# Patient Record
Sex: FEMALE | Race: OTHER | Employment: PART TIME | URBAN - METROPOLITAN AREA
[De-identification: names, ages, dates, MRNs, and addresses within clinical notes are randomized per-mention and may not be internally consistent; named-entity substitution may affect disease eponyms.]

---

## 2020-08-30 ENCOUNTER — HOSPITAL ENCOUNTER (EMERGENCY)
Age: 30
Discharge: HOME | End: 2020-08-30
Attending: EMERGENCY MEDICINE
Payer: MEDICAID

## 2020-08-30 ENCOUNTER — APPOINTMENT (OUTPATIENT)
Dept: ULTRASOUND IMAGING | Age: 30
End: 2020-08-30
Attending: EMERGENCY MEDICINE
Payer: MEDICAID

## 2020-08-30 VITALS
HEART RATE: 66 BPM | TEMPERATURE: 97.6 F | SYSTOLIC BLOOD PRESSURE: 111 MMHG | DIASTOLIC BLOOD PRESSURE: 76 MMHG | RESPIRATION RATE: 14 BRPM | OXYGEN SATURATION: 99 %

## 2020-08-30 DIAGNOSIS — N83.201 HEMORRHAGIC CYST OF RIGHT OVARY: Primary | ICD-10-CM

## 2020-08-30 DIAGNOSIS — R10.2 PELVIC PAIN IN FEMALE: ICD-10-CM

## 2020-08-30 LAB
ALBUMIN SERPL-MCNC: 3.6 G/DL (ref 3.4–5)
ALBUMIN/GLOB SERPL: 0.8 {RATIO} (ref 0.8–1.7)
ALP SERPL-CCNC: 63 U/L (ref 45–117)
ALT SERPL-CCNC: 19 U/L (ref 13–56)
ANION GAP SERPL CALC-SCNC: 2 MMOL/L (ref 3–18)
APPEARANCE UR: CLEAR
AST SERPL-CCNC: 22 U/L (ref 10–38)
BASOPHILS # BLD: 0 K/UL (ref 0–0.1)
BASOPHILS NFR BLD: 0 % (ref 0–2)
BILIRUB SERPL-MCNC: 0.3 MG/DL (ref 0.2–1)
BILIRUB UR QL: NEGATIVE
BUN SERPL-MCNC: 8 MG/DL (ref 7–18)
BUN/CREAT SERPL: 14 (ref 12–20)
CALCIUM SERPL-MCNC: 8.7 MG/DL (ref 8.5–10.1)
CHLORIDE SERPL-SCNC: 108 MMOL/L (ref 100–111)
CO2 SERPL-SCNC: 30 MMOL/L (ref 21–32)
COLOR UR: YELLOW
CREAT SERPL-MCNC: 0.56 MG/DL (ref 0.6–1.3)
DIFFERENTIAL METHOD BLD: ABNORMAL
EOSINOPHIL # BLD: 0 K/UL (ref 0–0.4)
EOSINOPHIL NFR BLD: 0 % (ref 0–5)
ERYTHROCYTE [DISTWIDTH] IN BLOOD BY AUTOMATED COUNT: 14.8 % (ref 11.6–14.5)
GLOBULIN SER CALC-MCNC: 4.6 G/DL (ref 2–4)
GLUCOSE SERPL-MCNC: 90 MG/DL (ref 74–99)
GLUCOSE UR STRIP.AUTO-MCNC: NEGATIVE MG/DL
HCG UR QL: NEGATIVE
HCT VFR BLD AUTO: 34.2 % (ref 35–45)
HGB BLD-MCNC: 10.9 G/DL (ref 12–16)
HGB UR QL STRIP: NEGATIVE
KETONES UR QL STRIP.AUTO: NEGATIVE MG/DL
LEUKOCYTE ESTERASE UR QL STRIP.AUTO: NEGATIVE
LYMPHOCYTES # BLD: 1.5 K/UL (ref 0.9–3.6)
LYMPHOCYTES NFR BLD: 21 % (ref 21–52)
MCH RBC QN AUTO: 25.8 PG (ref 24–34)
MCHC RBC AUTO-ENTMCNC: 31.9 G/DL (ref 31–37)
MCV RBC AUTO: 81 FL (ref 74–97)
MONOCYTES # BLD: 0.7 K/UL (ref 0.05–1.2)
MONOCYTES NFR BLD: 9 % (ref 3–10)
NEUTS SEG # BLD: 5 K/UL (ref 1.8–8)
NEUTS SEG NFR BLD: 70 % (ref 40–73)
NITRITE UR QL STRIP.AUTO: NEGATIVE
PH UR STRIP: 6 [PH] (ref 5–8)
PLATELET # BLD AUTO: 250 K/UL (ref 135–420)
PMV BLD AUTO: 10.2 FL (ref 9.2–11.8)
POTASSIUM SERPL-SCNC: 3.6 MMOL/L (ref 3.5–5.5)
PROT SERPL-MCNC: 8.2 G/DL (ref 6.4–8.2)
PROT UR STRIP-MCNC: NEGATIVE MG/DL
RBC # BLD AUTO: 4.22 M/UL (ref 4.2–5.3)
SERVICE CMNT-IMP: NORMAL
SODIUM SERPL-SCNC: 140 MMOL/L (ref 136–145)
SP GR UR REFRACTOMETRY: 1.01 (ref 1–1.03)
UROBILINOGEN UR QL STRIP.AUTO: 0.2 EU/DL (ref 0.2–1)
WBC # BLD AUTO: 7.1 K/UL (ref 4.6–13.2)
WET PREP GENITAL: NORMAL

## 2020-08-30 PROCEDURE — 81025 URINE PREGNANCY TEST: CPT

## 2020-08-30 PROCEDURE — 87210 SMEAR WET MOUNT SALINE/INK: CPT

## 2020-08-30 PROCEDURE — 99284 EMERGENCY DEPT VISIT MOD MDM: CPT

## 2020-08-30 PROCEDURE — 80053 COMPREHEN METABOLIC PANEL: CPT

## 2020-08-30 PROCEDURE — 81003 URINALYSIS AUTO W/O SCOPE: CPT

## 2020-08-30 PROCEDURE — 85025 COMPLETE CBC W/AUTO DIFF WBC: CPT

## 2020-08-30 PROCEDURE — 76830 TRANSVAGINAL US NON-OB: CPT

## 2020-08-30 PROCEDURE — 87491 CHLMYD TRACH DNA AMP PROBE: CPT

## 2020-08-30 RX ORDER — IBUPROFEN 800 MG/1
800 TABLET ORAL EVERY 8 HOURS
Qty: 21 TAB | Refills: 0 | Status: SHIPPED | OUTPATIENT
Start: 2020-08-30 | End: 2020-09-04

## 2020-08-30 NOTE — DISCHARGE INSTRUCTIONS
Patient Education        Hemorrhagic Ovarian Cyst: Care Instructions  Your Care Instructions     Sometimes a sac forms on the surface of a woman's ovary. When the sac swells up with fluid, it forms a cyst. If the cyst bleeds, it is called a hemorrhagic (say \"Sussy\") ovarian cyst. If the cyst breaks open, blood and fluid spill out into the lower belly and pelvis. You may not have symptoms from the cyst. But if it is large, or if it twists or breaks open, you may have pain or other problems. You may feel pain from the cyst or have symptoms from losing blood. Your doctor may use a pelvic ultrasound to see if you have a cyst. Blood tests can help your doctor tell if the cyst is bleeding or you have lost a lot of blood. Treatment depends on your symptoms. If they are mild, your doctor may suggest carefully watching your symptoms and doing blood tests again. But if you have a cyst that is very large, bleeds a lot, or causes other problems, your doctor may suggest surgery to remove it. If the bleeding is heavy, you may also need treatment to replace the blood. Follow-up care is a key part of your treatment and safety. Be sure to make and go to all appointments, and call your doctor if you are having problems. It's also a good idea to know your test results and keep a list of the medicines you take. How can you care for yourself at home? · Use heat, such as a warm water bottle, a heating pad set on low, or a warm bath, to relax tense muscles and relieve cramping. · Be safe with medicines. Read and follow all instructions on the label. ? If the doctor gave you a prescription medicine for pain, take it as prescribed. ? If you are not taking a prescription pain medicine, ask your doctor if you can take an over-the-counter medicine. When should you call for help? [id number] anytime you think you may need emergency care. For example, call if:  · You passed out (lost consciousness).   Call your doctor now or seek immediate medical care if:  · You have severe vaginal bleeding. · You are dizzy or lightheaded, or you feel like you may faint. · You have new or worse pain in your belly or pelvis. Watch closely for changes in your health, and be sure to contact your doctor if:  · You think you may be pregnant. · You do not get better as expected. Where can you learn more? Go to http://www.gray.com/  Enter D256 in the search box to learn more about \"Hemorrhagic Ovarian Cyst: Care Instructions. \"  Current as of: November 8, 2019               Content Version: 12.5  © 5643-2501 [employer]. Care instructions adapted under license by [employer] (which disclaims liability or warranty for this information). If you have questions about a medical condition or this instruction, always ask your healthcare professional. [address] any warranty or liability for your use of this information.

## 2020-08-30 NOTE — ED NOTES
Anna Alaniz RN gave d/c to home instructions to pt. Pt verbalizes understanding. VSS. Pt d/c to home.

## 2020-08-30 NOTE — ED PROVIDER NOTES
HPI   Patient presents with a chief complaint of dysuria and pelvic pain. She states that her symptoms began 2 days ago and also reports mild lower back pain. She denies any hematuria, fever, flank pain, vaginal discharge, or vaginal bleeding. Patient is  2, para 2-0-0-2. Her last menstrual period was approximately 3 weeks ago. Past Medical History:   Diagnosis Date    Abnormal Papanicolaou smear of cervix 2012    colpo       Past Surgical History:   Procedure Laterality Date    HX BREAST LUMPECTOMY      rt breast lump removed    HX  SECTION  ,     failure to progress.           Family History:   Problem Relation Age of Onset   Gilberto Hoose Hypertension Mother     Asthma Mother     No Known Problems Father     Thyroid Disease Maternal Grandmother     Diabetes Paternal Grandmother     Asthma Paternal Grandmother     Heart Disease Paternal Grandmother        Social History     Socioeconomic History    Marital status: SINGLE     Spouse name: Not on file    Number of children: Not on file    Years of education: Not on file    Highest education level: Not on file   Occupational History    Not on file   Social Needs    Financial resource strain: Not on file    Food insecurity     Worry: Not on file     Inability: Not on file    Transportation needs     Medical: Not on file     Non-medical: Not on file   Tobacco Use    Smoking status: Former Smoker    Smokeless tobacco: Never Used    Tobacco comment: social smoker in the past.    Substance and Sexual Activity    Alcohol use: No     Alcohol/week: 0.0 standard drinks    Drug use: No    Sexual activity: Not Currently     Partners: Male     Birth control/protection: Abstinence   Lifestyle    Physical activity     Days per week: Not on file     Minutes per session: Not on file    Stress: Not on file   Relationships    Social connections     Talks on phone: Not on file     Gets together: Not on file     Attends Voodoo service: Not on file     Active member of club or organization: Not on file     Attends meetings of clubs or organizations: Not on file     Relationship status: Not on file    Intimate partner violence     Fear of current or ex partner: Not on file     Emotionally abused: Not on file     Physically abused: Not on file     Forced sexual activity: Not on file   Other Topics Concern    Not on file   Social History Narrative    Not on file         ALLERGIES: Patient has no known allergies. Review of Systems   Constitutional: Negative for chills, fatigue and fever. HENT: Negative for congestion, ear pain, postnasal drip and sore throat. Eyes: Negative for pain, discharge and itching. Respiratory: Negative for cough, shortness of breath and wheezing. Cardiovascular: Negative for palpitations and leg swelling. Gastrointestinal: Negative for abdominal pain, diarrhea, nausea and vomiting. Endocrine: Negative for polydipsia, polyphagia and polyuria. Genitourinary: Positive for dysuria and pelvic pain. Negative for flank pain, frequency, vaginal bleeding, vaginal discharge and vaginal pain. Musculoskeletal: Negative for arthralgias, back pain, myalgias, neck pain and neck stiffness. Allergic/Immunologic: Negative for immunocompromised state. Neurological: Negative for dizziness, seizures, syncope and headaches. Hematological: Negative for adenopathy. Does not bruise/bleed easily. Psychiatric/Behavioral: Negative for agitation and behavioral problems. The patient is not nervous/anxious and is not hyperactive. Vitals:    08/30/20 0933   BP: 133/85   Pulse: 86   Resp: 16   Temp: 98.8 °F (37.1 °C)   SpO2: 100%            Physical Exam  Vitals signs and nursing note reviewed. Constitutional:       General: She is not in acute distress. Appearance: She is well-developed. She is not diaphoretic. HENT:      Head: Normocephalic and atraumatic.       Right Ear: External ear normal.      Left Ear: External ear normal.      Nose: Nose normal.      Mouth/Throat:      Pharynx: No oropharyngeal exudate. Eyes:      General: No scleral icterus. Right eye: No discharge. Left eye: No discharge. Conjunctiva/sclera: Conjunctivae normal.      Pupils: Pupils are equal, round, and reactive to light. Neck:      Thyroid: No thyromegaly. Vascular: No JVD. Trachea: No tracheal deviation. Cardiovascular:      Rate and Rhythm: Normal rate and regular rhythm. Heart sounds: Normal heart sounds. No murmur. No friction rub. No gallop. Pulmonary:      Effort: Pulmonary effort is normal. No respiratory distress. Breath sounds: Normal breath sounds. No stridor. No wheezing or rales. Chest:      Chest wall: No tenderness. Abdominal:      General: Bowel sounds are normal. There is no distension. Palpations: Abdomen is soft. There is no mass. Tenderness: Tenderness: mild. There is no guarding or rebound. Genitourinary:     Vagina: Normal. No foreign body. No vaginal discharge. Comments: Mild bilateral adnexal tenderness. Musculoskeletal: Normal range of motion. General: No tenderness. Lymphadenopathy:      Cervical: No cervical adenopathy. Skin:     General: Skin is warm and dry. Coloration: Skin is not pale. Findings: No erythema or rash. Neurological:      Mental Status: She is alert and oriented to person, place, and time. Cranial Nerves: No cranial nerve deficit. Deep Tendon Reflexes: Reflexes are normal and symmetric.    Psychiatric:         Behavior: Behavior normal.         Judgment: Judgment normal.          MDM  Number of Diagnoses or Management Options  Diagnosis management comments: Differential diagnosis includes: UTI, vaginitis, uterine fibroids       Amount and/or Complexity of Data Reviewed  Clinical lab tests: reviewed and ordered  Tests in the radiology section of CPT®: ordered  Tests in the medicine section of CPT®: ordered and reviewed  Review and summarize past medical records: yes    Risk of Complications, Morbidity, and/or Mortality  Presenting problems: moderate  Diagnostic procedures: moderate  Management options: moderate    Patient Progress  Patient progress: stable    Procedures    Recent Results (from the past 12 hour(s))   URINALYSIS W/ RFLX MICROSCOPIC    Collection Time: 08/30/20  9:37 AM   Result Value Ref Range    Color YELLOW      Appearance CLEAR      Specific gravity 1.013 1.005 - 1.030      pH (UA) 6.0 5.0 - 8.0      Protein Negative NEG mg/dL    Glucose Negative NEG mg/dL    Ketone Negative NEG mg/dL    Bilirubin Negative NEG      Blood Negative NEG      Urobilinogen 0.2 0.2 - 1.0 EU/dL    Nitrites Negative NEG      Leukocyte Esterase Negative NEG     HCG URINE, QL    Collection Time: 08/30/20  9:37 AM   Result Value Ref Range    HCG urine, QL Negative NEG     WET PREP    Collection Time: 08/30/20 11:00 AM    Specimen: Vagina   Result Value Ref Range    Special Requests: NO SPECIAL REQUESTS      Wet prep NO YEAST,TRICHOMONAS OR CLUE CELLS NOTED     CBC WITH AUTOMATED DIFF    Collection Time: 08/30/20 11:40 AM   Result Value Ref Range    WBC 7.1 4.6 - 13.2 K/uL    RBC 4.22 4.20 - 5.30 M/uL    HGB 10.9 (L) 12.0 - 16.0 g/dL    HCT 34.2 (L) 35.0 - 45.0 %    MCV 81.0 74.0 - 97.0 FL    MCH 25.8 24.0 - 34.0 PG    MCHC 31.9 31.0 - 37.0 g/dL    RDW 14.8 (H) 11.6 - 14.5 %    PLATELET 367 998 - 531 K/uL    MPV 10.2 9.2 - 11.8 FL    NEUTROPHILS 70 40 - 73 %    LYMPHOCYTES 21 21 - 52 %    MONOCYTES 9 3 - 10 %    EOSINOPHILS 0 0 - 5 %    BASOPHILS 0 0 - 2 %    ABS. NEUTROPHILS 5.0 1.8 - 8.0 K/UL    ABS. LYMPHOCYTES 1.5 0.9 - 3.6 K/UL    ABS. MONOCYTES 0.7 0.05 - 1.2 K/UL    ABS. EOSINOPHILS 0.0 0.0 - 0.4 K/UL    ABS.  BASOPHILS 0.0 0.0 - 0.1 K/UL    DF AUTOMATED     METABOLIC PANEL, COMPREHENSIVE    Collection Time: 08/30/20 11:40 AM   Result Value Ref Range    Sodium 140 136 - 145 mmol/L    Potassium 3.6 3.5 - 5.5 mmol/L Chloride 108 100 - 111 mmol/L    CO2 30 21 - 32 mmol/L    Anion gap 2 (L) 3.0 - 18 mmol/L    Glucose 90 74 - 99 mg/dL    BUN 8 7.0 - 18 MG/DL    Creatinine 0.56 (L) 0.6 - 1.3 MG/DL    BUN/Creatinine ratio 14 12 - 20      GFR est AA >60 >60 ml/min/1.73m2    GFR est non-AA >60 >60 ml/min/1.73m2    Calcium 8.7 8.5 - 10.1 MG/DL    Bilirubin, total 0.3 0.2 - 1.0 MG/DL    ALT (SGPT) 19 13 - 56 U/L    AST (SGOT) 22 10 - 38 U/L    Alk. phosphatase 63 45 - 117 U/L    Protein, total 8.2 6.4 - 8.2 g/dL    Albumin 3.6 3.4 - 5.0 g/dL    Globulin 4.6 (H) 2.0 - 4.0 g/dL    A-G Ratio 0.8 0.8 - 1.7         US TRANSVAGINAL    (Results Pending)     Diagnosis:   1. Hemorrhagic cyst of right ovary          Disposition: Discharge home    Follow-up Information     Follow up With Specialties Details Why Contact Info    Theo Whaley DO Family Medicine Schedule an appointment as soon as possible for a visit in 2 days  [id number] [id number] DEPT Emergency Medicine  As needed, If symptoms worsen [address]  [phone]          Patient's Medications   Start Taking    IBUPROFEN (MOTRIN) 800 MG TABLET    Take 1 Tab by mouth every eight (8) hours for 5 days.    Continue Taking    No medications on file   These Medications have changed    No medications on file   Stop Taking    No medications on file

## 2020-09-01 LAB
C TRACH RRNA SPEC QL NAA+PROBE: NEGATIVE
N GONORRHOEA RRNA SPEC QL NAA+PROBE: NEGATIVE
SPECIMEN SOURCE: NORMAL

## 2020-12-18 ENCOUNTER — VIRTUAL VISIT (OUTPATIENT)
Dept: FAMILY MEDICINE CLINIC | Age: 30
End: 2020-12-18
Payer: MEDICAID

## 2020-12-18 DIAGNOSIS — R10.2 PELVIC PAIN: ICD-10-CM

## 2020-12-18 DIAGNOSIS — N83.201 CYST OF RIGHT OVARY: ICD-10-CM

## 2020-12-18 DIAGNOSIS — D25.9 UTERINE LEIOMYOMA, UNSPECIFIED LOCATION: Primary | ICD-10-CM

## 2020-12-18 PROCEDURE — 99213 OFFICE O/P EST LOW 20 MIN: CPT | Performed by: NURSE PRACTITIONER

## 2020-12-18 RX ORDER — IBUPROFEN 800 MG/1
800 TABLET ORAL
Qty: 30 TAB | Refills: 2 | Status: SHIPPED | OUTPATIENT
Start: 2020-12-18

## 2020-12-18 NOTE — PROGRESS NOTES
Laura Abebe was seen by synchronous (real-time) audio-video technology DOXY on 12/18/2020. Consent: Laura Abebe, who was seen by synchronous (real-time) audio-video technology, and/or her healthcare decision maker, is aware that this patient-initiated, Telehealth encounter on 12/18/2020 is a billable service, with coverage as determined by her insurance carrier. She is aware that she may receive a bill and has provided verbal consent to proceed: yes  712  Subjective:     HPI:  Laura Abebe is a 26 y.o. female who was seen for the following:     Presents to establish care with me as PCP. Previous PCP was Dr. Rere Lora    Pelvic pain  Onset was about 1 year ago, worse in the last few months. Went to ER in June. It has been about the same since then, coming and going. Right lower quadrant pain intermittently. Worse with walking, urination, palpation. Feels like a ball under the skin, but nothing protruding. The mass is located superior to the c section scar. Had 2 c sections. Last one was 9 years ago. Menstrual cycles have been lighter than usual sometimes. Somewhat irregular, varies between 3-5 weeks between cycles. The pelvic pain is worse right after her period. Review of Systems   Constitutional: Negative for appetite change, chills, diaphoresis, fatigue, fever and unexpected weight change. Eyes: Negative for visual disturbance. Respiratory: Negative for cough, chest tightness and shortness of breath. Cardiovascular: Negative for chest pain, palpitations and leg swelling. Gastrointestinal: Negative for abdominal distention, abdominal pain, blood in stool, constipation, diarrhea, nausea, rectal pain and vomiting. Endocrine: Negative for polydipsia, polyphagia and polyuria. Genitourinary: Positive for menstrual problem and pelvic pain.  Negative for decreased urine volume, difficulty urinating, dyspareunia, dysuria, flank pain, frequency, genital sores, hematuria, urgency, vaginal bleeding, vaginal discharge and vaginal pain. Musculoskeletal: Negative for back pain, joint swelling and myalgias. Skin: Negative for rash and wound. Neurological: Negative for dizziness, weakness, light-headedness, numbness and headaches. Psychiatric/Behavioral: Negative for dysphoric mood and sleep disturbance. The patient is not nervous/anxious. Past Medical History, Past Surgery History, Allergies, Social History, and Family History were reviewed and updated. There is no problem list on file for this patient. Past Medical History:   Diagnosis Date    Abnormal Papanicolaou smear of cervix 2012    colpo     Patient Care Team:  Demetrius Reyna NP as PCP - General (Nurse Practitioner)  Demetrius Reyna NP as PCP - [facility]    Past Surgical History:   Procedure Laterality Date    HX BREAST LUMPECTOMY      rt breast lump removed    HX  SECTION  ,     failure to progress.  HX OTHER SURGICAL  ?    history of something removed from neck     Family History   Problem Relation Age of Onset   Bill Rowell Hypertension Mother     Asthma Mother     No Known Problems Father     Thyroid Disease Maternal Grandmother     Diabetes Paternal Grandmother     Asthma Paternal Grandmother     Heart Disease Paternal Grandmother      Social History     Tobacco Use    Smoking status: Never Smoker    Smokeless tobacco: Never Used    Tobacco comment: social smoker in the past.    Substance Use Topics    Alcohol use: No     Alcohol/week: 0.0 standard drinks    Drug use: No     No Known Allergies  No current outpatient medications on file prior to visit. No current facility-administered medications on file prior to visit.       Health Maintenance Due   Topic Date Due    DTaP/Tdap/Td series (1 - Tdap) 10/21/2009    PAP AKA CERVICAL CYTOLOGY  2019    Flu Vaccine (1) 2020         Objective     PHYSICAL EXAMINATION:    Vital Signs: (As obtained by patient/caregiver at home)   No flowsheet data found. General: Alert, cooperative, no distress   Mental  status: Normal mood, behavior, speech, dress, motor activity, and thought processes, able to follow commands   HENT: Normocephalic, atraumatic   Neck: No visualized mass   Resp: No respiratory distress   Neuro: No gross deficits   Skin: No discoloration or lesions of concern on visible areas   Psychiatric: Normal affect, consistent with stated mood, no evidence of hallucinations     Additional exam findings: none      LABS     TESTS  Status: Edited Result - FINAL (Exam End: 8/30/2020 13:41) Provider Status: Reviewed   Study Result    Ultrasound Pelvis: Transvaginal     INDICATION: Dysuria and pelvic pain.     COMPARISON: None.     TECHNIQUE: Real-time transvaginal sonography in multiple planes was performed  with image documentation. Grayscale, color flow Doppler imaging, and velocity  spectral waveform analysis of the ovaries  was performed (duplex imaging).     FINDINGS:     UTERUS: Normal in size and echotexture measuring  8.6 x 5.2 x 5.8 cm. Several  hypoechoic uterine fibroids are noted:     > 1.7 cm uterine fundal fibroid.     > 1.7 cm lateral uterine body fibroid.     ENDOMETRIUM: Normal in echotexture and thickness measuring  0.6 cm.     RIGHT OVARY and ADNEXA: Right ovary is normal in size and echotexture measuring   4.9 x 4.6 x 4.3 cm. Complex right ovarian structure measures 3.3 cm in maximal  dimension, with lacy internal features. Color and spectral Doppler demonstrate  normal flow to the right ovary with no evidence of ovarian torsion. The systolic  and venous waveforms are within normal limits.     LEFT OVARY and ADNEXA: Left ovary is normal in size and echotexture measuring  3.3 x 1.6 x 3.7 cm. No ovarian or adnexal masses identified. Color and spectral  Doppler demonstrate normal flow to the left ovary with no evidence of ovarian  torsion.  The systolic and venous waveforms are within normal limits. .     OTHER: Small quantity of fluid present adjacent to the right ovary     IMPRESSION  IMPRESSION:     1. Likely right ovarian hemorrhagic cyst with small quantity of paraovarian  fluid. 2. Several small uterine fibroids as above. 3. Normal blood flow to each ovary without evidence of ovarian torsion. Imaging    [address] (Order: [id number]) - 8/30/2020      Assessment and Plan     1. Uterine leiomyoma, unspecified location  Per US done in August she had uterine fibroids and right ovarian cyst. I have referred her to OB/GYN for further eval and treatment. In the mean time may take ibuprofen prn pain. - REFERRAL TO OBSTETRICS AND GYNECOLOGY  - ibuprofen (MOTRIN) 800 mg tablet; Take 1 Tab by mouth every eight (8) hours as needed for Pain. Dispense: 30 Tab; Refill: 2    2. Pelvic pain  - REFERRAL TO OBSTETRICS AND GYNECOLOGY  - ibuprofen (MOTRIN) 800 mg tablet; Take 1 Tab by mouth every eight (8) hours as needed for Pain. Dispense: 30 Tab; Refill: 2    3. Cyst of right ovary  - REFERRAL TO OBSTETRICS AND GYNECOLOGY  - ibuprofen (MOTRIN) 800 mg tablet; Take 1 Tab by mouth every eight (8) hours as needed for Pain. Dispense: 30 Tab; Refill: 2              712  We discussed the expected course, resolution and complications of the diagnosis(es) in detail. Medication risks, benefits, costs, interactions, and alternatives were discussed as indicated. I advised her to contact the office if her condition worsens, changes or fails to improve as anticipated. She expressed understanding with the diagnosis(es) and plan. Mark Johnson is a 26 y.o. female who was evaluated by a video visit encounter for concerns as above. Patient identification was verified prior to start of the visit. A caregiver was present when appropriate.  Due to this being a TeleHealth encounter (During SEGKO-68 public health emergency), evaluation of the following organ systems was limited: Vitals/Constitutional/EENT/Resp/CV/GI//MS/Neuro/Skin/Heme-Lymph-Imm. Pursuant to the emergency declaration under the [address] [place] [address], Count includes the [facility] waiver authority and the [employer] Act, this Virtual  Visit was conducted, with patient's (and/or legal guardian's) consent, to reduce the patient's risk of exposure to COVID-19 and provide necessary medical care. Services were provided through a video synchronous discussion virtually to substitute for in-person clinic visit. Patient was located at home and provider was located at the office.       Signed electronically by Carmel Lopez DNP, TABATHA-BC

## 2021-03-04 ENCOUNTER — OFFICE VISIT (OUTPATIENT)
Dept: FAMILY MEDICINE CLINIC | Age: 31
End: 2021-03-04
Payer: MEDICAID

## 2021-03-04 ENCOUNTER — VIRTUAL VISIT (OUTPATIENT)
Dept: FAMILY MEDICINE CLINIC | Age: 31
End: 2021-03-04

## 2021-03-04 VITALS
HEART RATE: 87 BPM | WEIGHT: 158 LBS | SYSTOLIC BLOOD PRESSURE: 122 MMHG | TEMPERATURE: 99 F | RESPIRATION RATE: 20 BRPM | DIASTOLIC BLOOD PRESSURE: 82 MMHG | OXYGEN SATURATION: 99 % | BODY MASS INDEX: 27.12 KG/M2

## 2021-03-04 DIAGNOSIS — R22.1 NECK MASS: ICD-10-CM

## 2021-03-04 DIAGNOSIS — R22.1 NECK MASS: Primary | ICD-10-CM

## 2021-03-04 DIAGNOSIS — D25.9 UTERINE LEIOMYOMA, UNSPECIFIED LOCATION: Primary | ICD-10-CM

## 2021-03-04 PROCEDURE — 99214 OFFICE O/P EST MOD 30 MIN: CPT | Performed by: STUDENT IN AN ORGANIZED HEALTH CARE EDUCATION/TRAINING PROGRAM

## 2021-03-04 NOTE — PROGRESS NOTES
Lumps on neck several year, had ultrasound, but never had biopsy    2nd one first palpated about 5 days ago. Does not feel like it has grown. Painful with movement/    Denies fever, but has enn with pain in hands and in legs. Patrizia Richardson is a 26 y.o.  female and presents with    Chief Complaint   Patient presents with    Follow-up     lump neck       Subjective:  Patient states that she has had a lump on her neck for several years. Previously had had ultrasound, and CT scan of this. Patient was supposed to get biopsy however this fell through. Recently as of 5 days, patient felt a second nodule in her neck. She states this 1 is slightly painful with certain movement of her neck. She denies any fevers, chills, nausea or vomiting. Positive for myalgias. Patient would also like a new referral to the GYN. Previous GYN that she was referred to stated that they would not take her insurance. Patient does complain of irregular periods, significant pain when she does have her periods. Recent ultrasound showed several small uterine fibroids. There is no problem list on file for this patient. Past Medical History:   Diagnosis Date    Abnormal Papanicolaou smear of cervix 2012    [facility]      Past Surgical History:   Procedure Laterality Date    HX BREAST LUMPECTOMY      rt breast lump removed    HX  SECTION  ,     failure to progress.      HX OTHER SURGICAL  2018?    history of something removed from neck      Family History   Problem Relation Age of Onset    Hypertension Mother     Asthma Mother     No Known Problems Father     Thyroid Disease Maternal Grandmother     Diabetes Paternal Grandmother     Asthma Paternal Grandmother     Heart Disease Paternal Grandmother      Social History     Socioeconomic History    Marital status: SINGLE     Spouse name: Not on file    Number of children: Not on file    Years of education: Not on file    Highest education level: Not on file   Occupational History    Occupation:    Social Needs    Financial resource strain: Not on file    Food insecurity     Worry: Not on file     Inability: Not on file   [employer] needs     Medical: Not on file     Non-medical: Not on file   Tobacco Use    Smoking status: Never Smoker    Smokeless tobacco: Never Used    Tobacco comment: social smoker in the past.    Substance and Sexual Activity    Alcohol use: No     Alcohol/week: 0.0 standard drinks    Drug use: No    Sexual activity: Not Currently     Partners: Male     Birth control/protection: Abstinence   Lifestyle    Physical activity     Days per week: Not on file     Minutes per session: Not on file    Stress: Not on file   Relationships    Social connections     Talks on phone: Not on file     Gets together: Not on file     Attends Christianity service: Not on file     Active member of club or organization: Not on file     Attends meetings of clubs or organizations: Not on file     Relationship status: Not on file    Intimate partner violence     Fear of current or ex partner: Not on file     Emotionally abused: Not on file     Physically abused: Not on file     Forced sexual activity: Not on file   Other Topics Concern    Not on file   Social History Narrative    Not on file        Current Outpatient Medications   Medication Sig Dispense Refill    ibuprofen (MOTRIN) 800 mg tablet Take 1 Tab by mouth every eight (8) hours as needed for Pain. 30 Tab 2        ROS   Review of Systems   Constitutional: Negative for chills, fever and malaise/fatigue. HENT: Negative for congestion, ear discharge and ear pain. Eyes: Negative for blurred vision, pain and discharge. Respiratory: Negative for cough and shortness of breath. Cardiovascular: Negative for chest pain and palpitations. Gastrointestinal: Negative for abdominal pain, nausea and vomiting.    Genitourinary: Negative for dysuria, frequency and urgency. Skin: Negative for itching and rash. Neurological: Negative for dizziness, seizures, loss of consciousness and headaches. Psychiatric/Behavioral: Negative for substance abuse. Objective:  Vitals:    03/04/21 1440   BP: 122/82   Pulse: 87   Resp: 20   Temp: 99 °F (37.2 °C)   SpO2: 99%   Weight: 158 lb (71.7 kg)   PainSc:   4   LMP: 02/20/2021       Physical Exam  Constitutional:       Appearance: Normal appearance. HENT:      Right Ear: Tympanic membrane, ear canal and external ear normal.      Left Ear: Tympanic membrane, ear canal and external ear normal.      Mouth/Throat:      Mouth: Mucous membranes are dry. Pharynx: Oropharynx is clear. No oropharyngeal exudate or posterior oropharyngeal erythema. Eyes:      General: No scleral icterus. Right eye: No discharge. Left eye: No discharge. Neck:      Musculoskeletal: Full passive range of motion without pain, normal range of motion and neck supple. Thyroid: No thyroid tenderness. Cardiovascular:      Rate and Rhythm: Normal rate and regular rhythm. Pulses: Normal pulses. Pulmonary:      Effort: Pulmonary effort is normal. No respiratory distress. Breath sounds: Normal breath sounds. Lymphadenopathy:      Cervical: Cervical adenopathy present. Right cervical: No superficial, deep or posterior cervical adenopathy. Left cervical: Superficial cervical adenopathy present. Neurological:      Mental Status: She is alert and oriented to person, place, and time. Cranial Nerves: No cranial nerve deficit. Psychiatric:         Mood and Affect: Mood normal.         Behavior: Behavior normal.         Thought Content:  Thought content normal.         Judgment: Judgment normal.           LABS     TESTS  Result Information    Status: Final result (Exam End: 6/17/2016 16:07) Provider Status: Reviewed   CT NECK SOFT TISSUE W CONT: Result Notes               Study Result    HISTORY: Left neck mass           FINDINS:     There is an elliptical well-defined mass just underneath the left sternocleidomastoid muscle at the level of the thyroid cartilage measuring 3.5 cm in maximal height by 1.8 cm in width by 2.2 cm AP diameter at level III. No associated calcification is seen. There is homogeneous enhancement. There are numerous subcentimeter in short axis diameter nodes throughout the rest of the neck but no definite additional lymph node in the size is identified.     There is mild prominence of the adenoids as well as the tonsils.     The airway appears intact without evidence of compromise.         The parotid, submandibular, and the thyroid glands all appear intact.         The visualized vasculature appear normal with no evidence of thrombosis or significant stenosis.         Bony structures appear unremarkable. Visualized lung apices are clear. Small retention cyst or polyp is seen in the right maxillary antrum inferiorly. Frontal sinuses are not developed. Rest of the sinuses are clear.     IMPRESSION:     Prominent 3.5 cm x 1.8 cm x 2.2 cm well-defined oval mass with homogeneous enhancement at level 3 in the left side of the neck corresponding to the palpable mass. Statistically, this most likely represents a reactive lymph node. Has there been recent infection?  If this mass does not significantly improved or resolved over a period of time, a biopsy could be performed to exclude underlying neoplasm such as lymphoma or metastatic disease.        Results  [address] (Accession [id number]) (Order [id number])  Allergies     No Known Allergies   Exam Information    Status Exam Begun  Exam Ended    Final [99] 8/30/2020 12:38 8/30/2020  1:41 PM [id number]  1:41 PM   Result Information    Status: Edited Result - FINAL (Exam End: 8/30/2020 13:41) Provider Status: Reviewed   Study Result    Ultrasound Pelvis: Transvaginal     INDICATION: Dysuria and pelvic pain.     COMPARISON: None.     FINDINGS:     UTERUS: Normal in size and echotexture measuring  8.6 x 5.2 x 5.8 cm. Several  hypoechoic uterine fibroids are noted:     > 1.7 cm uterine fundal fibroid.     > 1.7 cm lateral uterine body fibroid.     ENDOMETRIUM: Normal in echotexture and thickness measuring  0.6 cm.     RIGHT OVARY and ADNEXA: Right ovary is normal in size and echotexture measuring   4.9 x 4.6 x 4.3 cm. Complex right ovarian structure measures 3.3 cm in maximal  dimension, with lacy internal features. Color and spectral Doppler demonstrate  normal flow to the right ovary with no evidence of ovarian torsion. The systolic  and venous waveforms are within normal limits.     LEFT OVARY and ADNEXA: Left ovary is normal in size and echotexture measuring  3.3 x 1.6 x 3.7 cm. No ovarian or adnexal masses identified. Color and spectral  Doppler demonstrate normal flow to the left ovary with no evidence of ovarian  torsion. The systolic and venous waveforms are within normal limits. .     OTHER: Small quantity of fluid present adjacent to the right ovary     IMPRESSION  IMPRESSION:     1. Likely right ovarian hemorrhagic cyst with small quantity of paraovarian  fluid. 2. Several small uterine fibroids as above. 3. Normal blood flow to each ovary without evidence of ovarian torsion. Assessment/Plan:    1. Uterine leiomyoma, unspecified location  Needs new referral.  - REFERRAL TO GYNECOLOGY    2. Neck mass  Patient will likely need a biopsy of her older neck mass. The new neck mass could be a lymph node as well, however due to the proximity to the old mass we will get new CT.    REFERRAL TO ENT-OTOLARYNGOLOGY  - CT NECK SOFT TISSUE W WO CONT; Future  - METABOLIC PANEL, BASIC; Future  - HCG URINE, QL; Future       Lab review: orders written for new lab studies as appropriate; see orders, no lab studies available for review at time of visit      I have discussed the diagnosis with the patient and the intended plan as seen in the above orders. The patient has received an after-visit summary and questions were answered concerning future plans. I have discussed medication side effects and warnings with the patient as well. I have reviewed the plan of care with the patient, accepted their input and they are in agreement with the treatment goals.          Lizandro Barnett MD

## 2021-03-04 NOTE — PROGRESS NOTES
Brielle Perkins was seen by synchronous (real-time) audio-video technology doxy on 3/4/2021. Consent: Brielle Perkins, who was seen by synchronous (real-time) audio-video technology, and/or her healthcare decision maker, is aware that this patient-initiated, Telehealth encounter on 3/4/2021 is a billable service, with coverage as determined by her insurance carrier. She is aware that she may receive a bill and has provided verbal consent to proceed: yes  712  Subjective:     HPI:  Brielle Perkins is a 26 y.o. female who was seen for the following:     Left neck mass, onset was 4-5 days ago. Moderately tender. May be growing. Denies any ear pain or respiratory symptoms. Pain in her hands, arms, and legs. Hand weakness. The pain is throughout her whole extremities, especially the small joints, her knees, feet, hands. Denies any known insect or tick bites. She has not checked her temperature. She denies any chills or fever. Eating normally. Denies any GI upset. Denies cough, congestion. Continues with pelvic pain. Previously diagnosed with uterine fibroids and has not been to GYN yet. Review of Systems     Past Medical History, Past Surgery History, Allergies, Social History, and Family History were reviewed and updated. Patient Active Problem List   Diagnosis Code    Lymphadenopathy R59.1     Past Medical History:   Diagnosis Date    Abnormal Papanicolaou smear of cervix 2012    [facility]     Patient Care Team:  Mabeline Closs, MD as PCP - General (Family Medicine)  Mabeline Closs, MD as PCP - [facility] Empaneled Provider    Past Surgical History:   Procedure Laterality Date    HX BREAST LUMPECTOMY      rt breast lump removed    HX  SECTION  ,     failure to progress.      HX OTHER SURGICAL  2018?    history of something removed from neck     Family History   Problem Relation Age of Onset    Hypertension Mother     Asthma Mother     No Known Problems Father     Thyroid Disease Maternal Grandmother     Diabetes Paternal Grandmother     Asthma Paternal Grandmother     Heart Disease Paternal Grandmother      Social History     Tobacco Use    Smoking status: Never Smoker    Smokeless tobacco: Never Used    Tobacco comment: social smoker in the past.    Substance Use Topics    Alcohol use: No     Alcohol/week: 0.0 standard drinks    Drug use: No     No Known Allergies  Current Outpatient Medications on File Prior to Visit   Medication Sig Dispense Refill    ibuprofen (MOTRIN) 800 mg tablet Take 1 Tab by mouth every eight (8) hours as needed for Pain. 30 Tab 2     No current facility-administered medications on file prior to visit. Health Maintenance Due   Topic Date Due    DTaP/Tdap/Td series (1 - Tdap) Never done    PAP AKA CERVICAL CYTOLOGY  03/30/2019    Flu Vaccine (1) Never done         Objective     PHYSICAL EXAMINATION:    Vital Signs: (As obtained by patient/caregiver at home)   Patient-Reported Vitals 3/2/2021   Patient-Reported Weight 155   Patient-Reported Height 53          General: Alert, cooperative, no distress   Mental  status: Normal mood, behavior, speech, dress, motor activity, and thought processes, able to follow commands   HENT: Normocephalic, atraumatic   Neck: No visualized mass   Resp: No respiratory distress   Neuro: No gross deficits   Skin: No discoloration or lesions of concern on visible areas   Psychiatric: Normal affect, consistent with stated mood, no evidence of hallucinations     Additional exam findings: none      LABS     TESTS      Assessment and Plan     1. Neck mass  I spoke with Dr. Stephan Hawk, who is in the office today and agreed to see patient in person today since I am not able to fully evaluate her problems via virtual visit. This encounter will be closed without a charge. 712  We discussed the expected course, resolution and complications of the diagnosis(es) in detail. Medication risks, benefits, costs, interactions, and alternatives were discussed as indicated. I advised her to contact the office if her condition worsens, changes or fails to improve as anticipated. She expressed understanding with the diagnosis(es) and plan. Anibal Powell is a 26 y.o. female who was evaluated by a video visit encounter for concerns as above. Patient identification was verified prior to start of the visit. A caregiver was present when appropriate. Due to this being a TeleHealth encounter (During ZVQ-35 public health emergency), evaluation of the following organ systems was limited: Vitals/Constitutional/EENT/Resp/CV/GI//MS/Neuro/Skin/Heme-Lymph-Imm. Pursuant to the emergency declaration under the [facility], [facility] waiver authority and the [employer] and [employer] Act, this Virtual  Visit was conducted, with patient's (and/or legal guardian's) consent, to reduce the patient's risk of exposure to COVID-19 and provide necessary medical care. Services were provided through a video synchronous discussion virtually to substitute for in-person clinic visit. Patient was located at home and provider was located at home.       Signed electronically by Parisa Claudio DNP, TABATHA-BC

## 2021-03-04 NOTE — PROGRESS NOTES
Nito Roy presents today for   Chief Complaint   Patient presents with    Follow-up     lump neck       Is someone accompanying this pt? no    Is the patient using any DME equipment during OV? no    Depression Screening:  3 most recent PHQ Screens 3/4/2021   Little interest or pleasure in doing things Not at all   Feeling down, depressed, irritable, or hopeless Not at all   Total Score PHQ 2 0       Learning Assessment:  Learning Assessment 5/10/2016   PRIMARY LEARNER Patient   PRIMARY LANGUAGE Wolof   LEARNER PREFERENCE PRIMARY DEMONSTRATION   ANSWERED BY patient   RELATIONSHIP SELF       Abuse Screening:  Abuse Screening Questionnaire 5/10/2016   Do you ever feel afraid of your partner? N   Are you in a relationship with someone who physically or mentally threatens you? N   Is it safe for you to go home? Y       Fall Risk  Fall Risk Assessment, last 12 mths 3/4/2021   Able to walk? Yes   Fall in past 12 months? 0       Health Maintenance reviewed and discussed and ordered per Provider. Health Maintenance Due   Topic Date Due    Hepatitis C Screening  Never done    DTaP/Tdap/Td series (1 - Tdap) Never done    PAP AKA CERVICAL CYTOLOGY  03/30/2019    Flu Vaccine (1) Never done   . Coordination of Care:  1. Have you been to the ER, urgent care clinic since your last visit? Hospitalized since your last visit? no    2. Have you seen or consulted any other health care providers outside of the [address] [place] [address] since your last visit? Include any pap smears or colon screening.  no      Last  Checked na  Last UDS Checked na  Last Pain contract signed: na

## 2021-03-05 LAB
BUN SERPL-MCNC: 11 MG/DL (ref 6–20)
BUN/CREAT SERPL: 20 (ref 9–23)
CALCIUM SERPL-MCNC: 9.2 MG/DL (ref 8.7–10.2)
CHLORIDE SERPL-SCNC: 102 MMOL/L (ref 96–106)
CO2 SERPL-SCNC: 24 MMOL/L (ref 20–29)
CREAT SERPL-MCNC: 0.54 MG/DL (ref 0.57–1)
GLUCOSE SERPL-MCNC: 78 MG/DL (ref 65–99)
HCG UR QL: NEGATIVE
POTASSIUM SERPL-SCNC: 3.9 MMOL/L (ref 3.5–5.2)
SODIUM SERPL-SCNC: 139 MMOL/L (ref 134–144)

## 2021-03-15 ENCOUNTER — HOSPITAL ENCOUNTER (OUTPATIENT)
Dept: CT IMAGING | Age: 31
Discharge: HOME | End: 2021-03-15
Attending: STUDENT IN AN ORGANIZED HEALTH CARE EDUCATION/TRAINING PROGRAM
Payer: MEDICAID

## 2021-03-15 DIAGNOSIS — R22.1 NECK MASS: ICD-10-CM

## 2021-03-15 PROCEDURE — 70491 CT SOFT TISSUE NECK W/DYE: CPT

## 2021-03-15 PROCEDURE — 74011000636 HC RX REV CODE- 636: Performed by: STUDENT IN AN ORGANIZED HEALTH CARE EDUCATION/TRAINING PROGRAM

## 2021-03-15 RX ADMIN — IOPAMIDOL 96 ML: 612 INJECTION, SOLUTION INTRAVENOUS at 08:07

## 2021-03-16 DIAGNOSIS — R59.9 ENLARGED LYMPH NODES: Primary | ICD-10-CM

## 2021-03-16 NOTE — PROGRESS NOTES
Call patient and discussed imaging results with her. Previously was calling patient had called Dr. Willa Gonzalez to discuss this patient's imaging. There is concern for possible lymphoma. Dr. Willa Gonzalez states that he will try to get her an appointment for this Friday in order to be followed up. Discussed this with patient, she is to make sure she gets a call from the office tomorrow. Patient verbalized understanding.

## 2021-03-17 DIAGNOSIS — R59.1 LYMPHADENOPATHY OF HEAD AND NECK: Primary | ICD-10-CM

## 2021-03-17 DIAGNOSIS — R59.0 SUPRACLAVICULAR LYMPHADENOPATHY: ICD-10-CM

## 2021-03-18 DIAGNOSIS — R59.1 LYMPHADENOPATHY OF HEAD AND NECK: ICD-10-CM

## 2021-03-18 DIAGNOSIS — R59.0 SUPRACLAVICULAR LYMPHADENOPATHY: ICD-10-CM

## 2021-03-19 ENCOUNTER — HOSPITAL ENCOUNTER (OUTPATIENT)
Dept: LAB | Age: 31
Discharge: HOME | End: 2021-03-19

## 2021-03-19 ENCOUNTER — OFFICE VISIT (OUTPATIENT)
Age: 31
End: 2021-03-19
Payer: MEDICAID

## 2021-03-19 ENCOUNTER — NURSE NAVIGATOR (OUTPATIENT)
Dept: OTHER | Age: 31
End: 2021-03-19

## 2021-03-19 VITALS
WEIGHT: 160.6 LBS | DIASTOLIC BLOOD PRESSURE: 78 MMHG | RESPIRATION RATE: 18 BRPM | BODY MASS INDEX: 29.55 KG/M2 | SYSTOLIC BLOOD PRESSURE: 115 MMHG | OXYGEN SATURATION: 100 % | HEART RATE: 84 BPM | HEIGHT: 62 IN

## 2021-03-19 DIAGNOSIS — R59.1 LYMPHADENOPATHY: Primary | ICD-10-CM

## 2021-03-19 LAB — XX-LABCORP SPECIMEN COL,LCBCF: NORMAL

## 2021-03-19 PROCEDURE — 99001 SPECIMEN HANDLING PT-LAB: CPT

## 2021-03-19 PROCEDURE — 99204 OFFICE O/P NEW MOD 45 MIN: CPT | Performed by: INTERNAL MEDICINE

## 2021-03-19 NOTE — NURSE NAVIGATOR
Saw pt in clinic with Dr. Sidra Albert for initial visit, c/o lymph nodes in neck. PET scan and IR core Bx of lymph node ordered. Labs today, RTC in 2 weeks, all questions answered.

## 2021-03-19 NOTE — PROGRESS NOTES
Irvin Kate is a 26 y.o. female presenting today for a new patient appointment. Patient is ambulatory with no assistive devices and denies any complaints. Provider was advised. Chief Complaint   Patient presents with    Gland Swelling     Lymphadenopathy       Visit Vitals  /78   Pulse 84   Resp 18   Ht 5' 2\" (1.575 m)   Wt 160 lb 9.6 oz (72.8 kg)   LMP 02/20/2021 (Exact Date)   SpO2 100%   BMI 29.37 kg/m²       Current Outpatient Medications   Medication Sig    ibuprofen (MOTRIN) 800 mg tablet Take 1 Tab by mouth every eight (8) hours as needed for Pain. No current facility-administered medications for this visit. Fall Risk Assessment, last 12 mths 3/4/2021   Able to walk? Yes   Fall in past 12 months? 0       3 most recent PHQ Screens 3/4/2021   Little interest or pleasure in doing things Not at all   Feeling down, depressed, irritable, or hopeless Not at all   Total Score PHQ 2 0       Abuse Screening Questionnaire 5/10/2016   Do you ever feel afraid of your partner? N   Are you in a relationship with someone who physically or mentally threatens you? N   Is it safe for you to go home?  Y       Learning Assessment 5/10/2016   PRIMARY LEARNER Patient   PRIMARY LANGUAGE Armenian   LEARNER PREFERENCE PRIMARY DEMONSTRATION   ANSWERED BY patient   RELATIONSHIP SELF

## 2021-03-19 NOTE — PROGRESS NOTES
[facility]  [address] [facility], [address]  [place], [facility]  Office Phone: [phone]  Fax: [phone]      Reason for visit:  Lymphadenopthy    HPI:   Jessica Martines is a 26 y.o.  female who I was asked to see in consultation at the request of Fermin Rosenthal NP and Ester Bragg for evaluation for lymphadenopathy. Patient said that she has been having this lymphadenopathy on the left side for at least 2 years now. Recently she has seen new 1 developing. I saw and examined patient today. She is awake alert oriented x3. On review of system she denies any fevers, chills, shortness of breath, nausea vomiting or abdominal pain. Reports enlarged lymph node on the neck. She has no unintentional weight loss. No drenching night sweats. ECOG performance status 0. Independent with ADLs and IADLs    DX   Encounter Diagnosis   Name Primary?  Lymphadenopathy Yes        STAGE:      ONCOLOGY HISTORY:   3/15/2021: CT soft tissue neck w/contrast  showed  Lymph nodes: There is an enlarged left level 3 node present at the level of the hyoid bone. This measures 2.9 x 2.1 x 4.3 cm. This is enlarged from the prior study. There are several prominent to slightly enlarged level 5 nodes also present. The largest measures 2.6 x 1.9 x 1.3 cm. A left supraclavicular node measures 1.5 x 1.3 cm. There is a prominent level 2 right neck node present at the angle of the mandible with a short axis measurement of 9 mm. Past Medical History:   Diagnosis Date    Abnormal Papanicolaou smear of cervix 2012    colpo     Past Surgical History:   Procedure Laterality Date    HX BREAST LUMPECTOMY  2014    rt breast lump removed    HX  SECTION  ,     failure to progress.      HX OTHER SURGICAL  2018?    history of something removed from neck     Social History     Socioeconomic History    Marital status: SINGLE     Spouse name: Not on file    Number of children: Not on file    Years of education: Not on file    Highest education level: Not on file   Occupational History    Occupation:    Tobacco Use    Smoking status: Never Smoker    Smokeless tobacco: Never Used    Tobacco comment: social smoker in the past.    Substance and Sexual Activity    Alcohol use: No     Alcohol/week: 0.0 standard drinks    Drug use: No    Sexual activity: Not Currently     Partners: Male     Birth control/protection: Abstinence     Family History   Problem Relation Age of Onset    Hypertension Mother     Asthma Mother     No Known Problems Father     Thyroid Disease Maternal Grandmother     Diabetes Paternal Grandmother     Asthma Paternal Grandmother     Heart Disease Paternal Grandmother        Current Outpatient Medications   Medication Sig Dispense Refill    ibuprofen (MOTRIN) 800 mg tablet Take 1 Tab by mouth every eight (8) hours as needed for Pain. 30 Tab 2       No Known Allergies    Review of Systems  As per HPI    Objective:  Physical Exam:  Visit Vitals  LMP 02/20/2021       General:  Alert, cooperative, no distress, appears stated age. Head:  Normocephalic, without obvious abnormality, atraumatic. Eyes:  Conjunctivae/corneas clear. PERRL, EOMs intact. Throat: Lips, mucosa, and tongue normal.    Neck: Supple, symmetrical, trachea midline, no adenopathy, thyroid: no enlargement/tenderness/nodules   Back:   Symmetric, no curvature. ROM normal. No CVA tenderness. Lungs:   Clear to auscultation bilaterally. Chest wall:  No tenderness or deformity. Heart:  Regular rate and rhythm, S1, S2 normal, no murmur, click, rub or gallop. Abdomen:   Soft, non-tender. Bowel sounds normal. No masses,  No organomegaly. Extremities: Extremities normal, atraumatic, no cyanosis or edema. Skin: Skin color, texture, turgor normal. No rashes or lesions. Lymph nodes: Cervical, supraclavicular, and axillary nodes normal.   Neurologic: CNII-XII intact.        Diagnostic Imaging     Results for orders placed during the hospital encounter of 03/15/21   CT NECK SOFT TISSUE W CONT    Narrative EXAM: CT NECK SOFT TISSUE W CONT    CLINICAL HISTORY/INDICATION: Left neck mass  -Additional: None    COMPARISON: 06/17/16    TECHNIQUE: A CT scan of the neck was performed. Axial images were obtained from  the skull base through the top of the chest.  The study was performed after the  administration of intravenous contrast material. Sagittal and coronal  reconstructions were performed. The patient received 96 cc of Isovue-300  intravenous contrast material without reported difficulty. One or more dose  reduction techniques were used on this CT: automated exposure control,  adjustment of the mAs and/or kVp according to patient size, and iterative  reconstruction techniques. The specific techniques used on this CT exam have  been documented in the patient's electronic medical record. Digital Imaging and  Communications in Medicine (DICOM) format image data are available to  nonaffiliated external healthcare facilities or entities on a secure, media  free, reciprocally searchable basis with patient authorization for at least a  12-month period after this study. _______________    FINDINGS:      Airway: Patent    Thyroid: Normal    Lymph nodes: There is an enlarged left level 3 node present at the level of the  hyoid bone. This measures 2.9 x 2.1 x 4.3 cm. This is enlarged from the prior  study. There are several prominent to slightly enlarged level 5 nodes also  present. The largest measures 2.6 x 1.9 x 1.3 cm. A left supraclavicular node  measures 1.5 x 1.3 cm. There is a prominent level 2 right neck node present at  the angle of the mandible with a short axis measurement of 9 mm. Soft tissues: Otherwise unremarkable    Skull base: Normal    Osseous structures: Intact    Other: Normal    _______________      Impression There are enlarged left neck nodes present.  These have increased in size from  prior imaging. Tissue correlation is recommended to exclude a neoplastic  process. This could be performed under ultrasound guidance. Lab Results  Lab Results   Component Value Date/Time    WBC 7.1 08/30/2020 11:40 AM    HGB 10.9 (L) 08/30/2020 11:40 AM    HCT 34.2 (L) 08/30/2020 11:40 AM    PLATELET 997 61/01/6205 11:40 AM    MCV 81.0 08/30/2020 11:40 AM       Lab Results   Component Value Date/Time    Sodium 139 03/04/2021 12:00 AM    Potassium 3.9 03/04/2021 12:00 AM    Chloride 102 03/04/2021 12:00 AM    CO2 24 03/04/2021 12:00 AM    Anion gap 2 (L) 08/30/2020 11:40 AM    Glucose 78 03/04/2021 12:00 AM    BUN 11 03/04/2021 12:00 AM    Creatinine 0.54 (L) 03/04/2021 12:00 AM    BUN/Creatinine ratio 20 03/04/2021 12:00 AM    GFR est  03/04/2021 12:00 AM    GFR est non- 03/04/2021 12:00 AM    Calcium 9.2 03/04/2021 12:00 AM    Alk. phosphatase 63 08/30/2020 11:40 AM    Protein, total 8.2 08/30/2020 11:40 AM    Albumin 3.6 08/30/2020 11:40 AM    Globulin 4.6 (H) 08/30/2020 11:40 AM    A-G Ratio 0.8 08/30/2020 11:40 AM    ALT (SGPT) 19 08/30/2020 11:40 AM   Follow-up with PCP for health maintenance    Assessment/Plan:  26 y.o. female  1. Lymphadenopathy  I had a long discussion with patient regarding her newly diagnosed lymphadenopathy. She denies any fevers, drenching night sweats, or weight loss. - CBC WITH AUTOMATED DIFF; Future  - METABOLIC PANEL, COMPREHENSIVE; Future  - LD; Future  - URIC ACID; Future  - PETCT  - Refer to IR for core  Biopsy of left sided cervical lymphadenopathy    2.   Anemia/uterine fibroid/menorrhagia:  *Follow-up with OB/GYN  *Check ferritin, iron profile B12 and folate and replace as needed    CC: Areli Cuevas NP  CC: Aura Galan  Return in 2 weeks

## 2021-03-20 DIAGNOSIS — R59.1 LYMPHADENOPATHY: ICD-10-CM

## 2021-03-20 LAB
ALBUMIN SERPL-MCNC: 4.4 G/DL (ref 3.8–4.8)
ALBUMIN/GLOB SERPL: 1.2 {RATIO} (ref 1.2–2.2)
ALP SERPL-CCNC: 92 IU/L (ref 39–117)
ALT SERPL-CCNC: 54 IU/L (ref 0–32)
AST SERPL-CCNC: 45 IU/L (ref 0–40)
BASOPHILS # BLD AUTO: 0 X10E3/UL (ref 0–0.2)
BASOPHILS NFR BLD AUTO: 1 %
BILIRUB SERPL-MCNC: 0.4 MG/DL (ref 0–1.2)
BUN SERPL-MCNC: 10 MG/DL (ref 6–20)
BUN/CREAT SERPL: 19 (ref 9–23)
CALCIUM SERPL-MCNC: 9.2 MG/DL (ref 8.7–10.2)
CHLORIDE SERPL-SCNC: 104 MMOL/L (ref 96–106)
CO2 SERPL-SCNC: 23 MMOL/L (ref 20–29)
CREAT SERPL-MCNC: 0.53 MG/DL (ref 0.57–1)
EOSINOPHIL # BLD AUTO: 0.1 X10E3/UL (ref 0–0.4)
EOSINOPHIL NFR BLD AUTO: 3 %
ERYTHROCYTE [DISTWIDTH] IN BLOOD BY AUTOMATED COUNT: 15.6 % (ref 11.7–15.4)
FERRITIN SERPL-MCNC: 66 NG/ML (ref 15–150)
FOLATE SERPL-MCNC: 17.7 NG/ML
GLOBULIN SER CALC-MCNC: 3.7 G/DL (ref 1.5–4.5)
GLUCOSE SERPL-MCNC: 82 MG/DL (ref 65–99)
HAV IGM SERPL QL IA: NEGATIVE
HBV CORE IGM SERPL QL IA: NEGATIVE
HBV SURFACE AG SERPL QL IA: NEGATIVE
HCT VFR BLD AUTO: 38.6 % (ref 34–46.6)
HCV AB S/CO SERPL IA: <0.1 S/CO RATIO (ref 0–0.9)
HGB BLD-MCNC: 12.7 G/DL (ref 11.1–15.9)
HIV 1+2 AB+HIV1 P24 AG SERPL QL IA: NON REACTIVE
IMM GRANULOCYTES # BLD AUTO: 0.1 X10E3/UL (ref 0–0.1)
IMM GRANULOCYTES NFR BLD AUTO: 2 %
IRON SATN MFR SERPL: 14 % (ref 15–55)
IRON SERPL-MCNC: 46 UG/DL (ref 27–159)
LDH SERPL-CCNC: 190 IU/L (ref 119–226)
LYMPHOCYTES # BLD AUTO: 1 X10E3/UL (ref 0.7–3.1)
LYMPHOCYTES NFR BLD AUTO: 18 %
MCH RBC QN AUTO: 26.2 PG (ref 26.6–33)
MCHC RBC AUTO-ENTMCNC: 32.9 G/DL (ref 31.5–35.7)
MCV RBC AUTO: 80 FL (ref 79–97)
MONOCYTES # BLD AUTO: 0.5 X10E3/UL (ref 0.1–0.9)
MONOCYTES NFR BLD AUTO: 10 %
MORPHOLOGY BLD-IMP: ABNORMAL
NEUTROPHILS # BLD AUTO: 3.6 X10E3/UL (ref 1.4–7)
NEUTROPHILS NFR BLD AUTO: 66 %
PLATELET # BLD AUTO: 117 X10E3/UL (ref 150–450)
POTASSIUM SERPL-SCNC: 4.1 MMOL/L (ref 3.5–5.2)
PROT SERPL-MCNC: 8.1 G/DL (ref 6–8.5)
RBC # BLD AUTO: 4.84 X10E6/UL (ref 3.77–5.28)
SODIUM SERPL-SCNC: 140 MMOL/L (ref 134–144)
TIBC SERPL-MCNC: 340 UG/DL (ref 250–450)
UIBC SERPL-MCNC: 294 UG/DL (ref 131–425)
URATE SERPL-MCNC: 4.9 MG/DL (ref 2.6–6.2)
VIT B12 SERPL-MCNC: 846 PG/ML (ref 232–1245)
WBC # BLD AUTO: 5.3 X10E3/UL (ref 3.4–10.8)

## 2021-04-02 ENCOUNTER — TRANSCRIBE ORDER (OUTPATIENT)
Dept: INTERVENTIONAL RADIOLOGY/VASCULAR | Age: 31
End: 2021-04-02

## 2021-04-02 ENCOUNTER — TELEPHONE (OUTPATIENT)
Age: 31
End: 2021-04-02

## 2021-04-02 DIAGNOSIS — R59.1 LYMPHADENOPATHY: Primary | ICD-10-CM

## 2021-04-05 ENCOUNTER — TELEPHONE (OUTPATIENT)
Age: 31
End: 2021-04-05

## 2021-04-05 NOTE — TELEPHONE ENCOUNTER
Patient is scheduled for her pet ct scan on 4/11/21 @ 11am. She needs to arrive 30 mins early with her id card and insurance. She is to eat a low carb diet the night before and only have water 6 hours prior to appt. Patient is aware of her appt time an directions.

## 2021-04-06 DIAGNOSIS — R59.1 LYMPHADENOPATHY: ICD-10-CM

## 2021-04-06 NOTE — H&P
Interventional Radiology      Patient seen in follow up for supraclavicular LN biopsy will be rescheduled. D/w patient.      Thank you

## 2021-04-07 ENCOUNTER — HOSPITAL ENCOUNTER (OUTPATIENT)
Dept: PET IMAGING | Age: 31
Discharge: HOME | End: 2021-04-07
Attending: INTERNAL MEDICINE
Payer: MEDICAID

## 2021-04-07 PROCEDURE — A9552 F18 FDG: HCPCS

## 2021-04-09 ENCOUNTER — HOSPITAL ENCOUNTER (OUTPATIENT)
Dept: ULTRASOUND IMAGING | Age: 31
Discharge: HOME | End: 2021-04-09
Attending: RADIOLOGY | Admitting: RADIOLOGY
Payer: MEDICAID

## 2021-04-09 VITALS
RESPIRATION RATE: 19 BRPM | SYSTOLIC BLOOD PRESSURE: 108 MMHG | WEIGHT: 162.38 LBS | HEIGHT: 63 IN | BODY MASS INDEX: 28.77 KG/M2 | HEART RATE: 99 BPM | TEMPERATURE: 98.7 F | DIASTOLIC BLOOD PRESSURE: 76 MMHG | OXYGEN SATURATION: 100 %

## 2021-04-09 LAB
ANION GAP SERPL CALC-SCNC: 5 MMOL/L (ref 3–18)
APTT PPP: 29.5 SEC (ref 23–36.4)
BUN SERPL-MCNC: 10 MG/DL (ref 7–18)
BUN/CREAT SERPL: 22 (ref 12–20)
CALCIUM SERPL-MCNC: 8.6 MG/DL (ref 8.5–10.1)
CHLORIDE SERPL-SCNC: 111 MMOL/L (ref 100–111)
CO2 SERPL-SCNC: 24 MMOL/L (ref 21–32)
CREAT SERPL-MCNC: 0.45 MG/DL (ref 0.6–1.3)
ERYTHROCYTE [DISTWIDTH] IN BLOOD BY AUTOMATED COUNT: 16.3 % (ref 11.6–14.5)
GLUCOSE SERPL-MCNC: 93 MG/DL (ref 74–99)
HCG UR QL: NEGATIVE
HCT VFR BLD AUTO: 34 % (ref 35–45)
HGB BLD-MCNC: 11.3 G/DL (ref 12–16)
INR PPP: 1.1 (ref 0.8–1.2)
MCH RBC QN AUTO: 26.6 PG (ref 24–34)
MCHC RBC AUTO-ENTMCNC: 33.2 G/DL (ref 31–37)
MCV RBC AUTO: 80 FL (ref 74–97)
PLATELET # BLD AUTO: 72 K/UL (ref 135–420)
PMV BLD AUTO: 11.7 FL (ref 9.2–11.8)
POTASSIUM SERPL-SCNC: 3.7 MMOL/L (ref 3.5–5.5)
PROTHROMBIN TIME: 14.1 SEC (ref 11.5–15.2)
RBC # BLD AUTO: 4.25 M/UL (ref 4.2–5.3)
SODIUM SERPL-SCNC: 140 MMOL/L (ref 136–145)
WBC # BLD AUTO: 8.3 K/UL (ref 4.6–13.2)

## 2021-04-09 PROCEDURE — 88305 TISSUE EXAM BY PATHOLOGIST: CPT

## 2021-04-09 PROCEDURE — 77030003503 US GUIDE BX LYMPH NOD SUPER

## 2021-04-09 PROCEDURE — 80048 BASIC METABOLIC PNL TOTAL CA: CPT

## 2021-04-09 PROCEDURE — 85027 COMPLETE CBC AUTOMATED: CPT

## 2021-04-09 PROCEDURE — 88334 PATH CONSLTJ SURG CYTO XM EA: CPT

## 2021-04-09 PROCEDURE — 88333 PATH CONSLTJ SURG CYTO XM 1: CPT

## 2021-04-09 PROCEDURE — 85610 PROTHROMBIN TIME: CPT

## 2021-04-09 PROCEDURE — 81025 URINE PREGNANCY TEST: CPT

## 2021-04-09 PROCEDURE — 85730 THROMBOPLASTIN TIME PARTIAL: CPT

## 2021-04-09 PROCEDURE — 74011250636 HC RX REV CODE- 250/636: Performed by: RADIOLOGY

## 2021-04-09 RX ORDER — SODIUM CHLORIDE 9 MG/ML
20 INJECTION, SOLUTION INTRAVENOUS CONTINUOUS
Status: DISCONTINUED | OUTPATIENT
Start: 2021-04-09 | End: 2021-04-09 | Stop reason: HOSPADM

## 2021-04-09 RX ADMIN — SODIUM CHLORIDE 20 ML/HR: 900 INJECTION, SOLUTION INTRAVENOUS at 08:30

## 2021-04-09 NOTE — DISCHARGE INSTRUCTIONS
Patient Education        Lymph Node Biopsy: What to Expect at Home  Your Recovery     A lymph node biopsy removes lymph node tissue. The tissue is then looked at under a microscope. It will be studied for signs of disease, such as cancer, or signs of infection. At the site where the tissue was removed, you may have:  · Pain. · Tenderness. · Bleeding. · Bruising. During the procedure, your doctor may have used a blue dye, a radioactive material, or both. The dye may give your skin a bluish color for several days after the biopsy. And it may turn your urine green for 1 or 2 days. The radioactive material leaves your body quickly through your urine. The amount of radiation used is very small and won't harm you. This care sheet gives you a general idea about how long it will take for you to recover. But each person recovers at a different pace. Follow the steps below to get better as quickly as possible. How can you care for yourself at home? Activity    · Rest when you feel tired.     · If you have an incision (cut) from the procedure, avoid activity or exercise that may put stress on that area. Diet    · You can eat your normal diet. If your stomach is upset, try bland, low-fat foods like plain rice, broiled chicken, toast, and yogurt. Medicines    · Your doctor will tell you if and when you can restart your medicines. He or she will also give you instructions about taking any new medicines.     · If you take aspirin or some other blood thinner, be sure to talk to your doctor. He or she will tell you if and when to start taking this medicine again. Make sure that you understand exactly what your doctor wants you to do.     · Take pain medicines exactly as directed. Incision care    · If you have strips of tape on an incision, leave them on until they fall off. Hygiene    · When you shower, wash the biopsy area with warm water, and then pat it dry.    Other instructions    · You might have a mild sore throat if a tube was used to help you breathe during the biopsy. Soothe your sore throat with lozenges, and gargle with warm salt water.     · Fluid may collect near the biopsy site. And fluid may leak from the biopsy site. Use an ice pack or take an over-the-counter pain medicine to relieve swelling and mild pain. Follow-up care is a key part of your treatment and safety. Be sure to make and go to all appointments, and call your doctor if you are having problems. It's also a good idea to know your test results and keep a list of the medicines you take. When should you call for help? Call  911 anytime you think you may need emergency care. For example, call if:    · You passed out (lost consciousness).     · You have chest pain, are short of breath, or cough up blood. Call your doctor now or seek immediate medical care if:    · You have pain that does not get better after you take pain medicine.     · You are sick to your stomach or can't drink fluids.     · Bright red blood has soaked through the bandage over your incision.     · You have symptoms of infection, such as:  ? Increased pain, swelling, warmth, or redness. ? Red streaks leading from the area. ? Pus draining from the area. ? A fever. Watch closely for changes in your health, and be sure to contact your doctor if you have any problems. Where can you learn more? Go to http://www.gray.com/  Enter P500 in the search box to learn more about \"Lymph Node Biopsy: What to Expect at Home. \"  Current as of: December 17, 2020               Content Version: 12.8  © 7527-4611 "[employer]. Care instructions adapted under license by [employer] (which disclaims liability or warranty for this information).  If you have questions about a medical condition or this instruction, always ask your healthcare professional. [place] [address] any warranty or liability for your use of this information. DISCHARGE SUMMARY from Nurse    PATIENT INSTRUCTIONS:    After general anesthesia or intravenous sedation, for 24 hours or while taking prescription Narcotics:  · Limit your activities  · Do not drive and operate hazardous machinery  · Do not make important personal or business decisions  · Do  not drink alcoholic beverages  · If you have not urinated within 8 hours after discharge, please contact your surgeon on call. Report the following to your surgeon:  · Excessive pain, swelling, redness or odor of or around the surgical area  · Temperature over 100.5  · Nausea and vomiting lasting longer than 4 hours or if unable to take medications  · Any signs of decreased circulation or nerve impairment to extremity: change in color, persistent  numbness, tingling, coldness or increase pain  · Any questions    These are general instructions for a healthy lifestyle:    No smoking/ No tobacco products/ Avoid exposure to second hand smoke  Surgeon General's Warning:  Quitting smoking now greatly reduces serious risk to your health. Obesity, smoking, and sedentary lifestyle greatly increases your risk for illness    A healthy diet, regular physical exercise & weight monitoring are important for maintaining a healthy lifestyle    You may be retaining fluid if you have a history of heart failure or if you experience any of the following symptoms:  Weight gain of 3 pounds or more overnight or 5 pounds in a week, increased swelling in our hands or feet or shortness of breath while lying flat in bed. Please call your doctor as soon as you notice any of these symptoms; do not wait until your next office visit. The discharge information has been reviewed with the patient. The patient verbalized understanding. Discharge medications reviewed with the patient and appropriate educational materials and side effects teaching were provided.     Patient armband removed and shredded

## 2021-04-09 NOTE — H&P
OUTPATIENT HISTORY AND PHYSICAL      Today 2021     Indication/Symptoms:   Mallory Velez is a 26 y.o. female here for left level 3 LN core needle Bx. Current Meds:    Prior to Admission medications    Medication Sig Start Date End Date Taking? Authorizing Provider   ibuprofen (MOTRIN) 800 mg tablet Take 1 Tab by mouth every eight (8) hours as needed for Pain. 20  Yes Max Fess, NP       Allergies:    No Known Allergies    Comorbid Conditions:    Past Medical History:   Diagnosis Date    Abnormal Papanicolaou smear of cervix 2012    colpo          Past Surgical History:   Procedure Laterality Date    HX BREAST LUMPECTOMY      rt breast lump removed    HX  SECTION  ,     failure to progress.  HX OTHER SURGICAL  ?    history of something removed from neck     Data:    Visit Vitals  /76 (BP 1 Location: Left upper arm, BP Patient Position: At rest)   Pulse 99   Temp 98.7 °F (37.1 °C)   Resp 19   Ht 5' 3\" (1.6 m)   Wt 73.7 kg (162 lb 6 oz)   SpO2 100%   BMI 28.76 kg/m²   :  Recent Labs     21  0806   PLT 72*     Recent Labs     21  0806   INR 1.1   APTT 29.5       The H & P and/or progress notes and any available imaging were reviewed. The risks, indications and possible alternatives to the procedure, including doing nothing, were discussed and informed consent was obtained. Physical Exam:      Mental status:   Alert and oriented. Examination specific to the procedure proposed to be performed and any co morbid conditions:   Mallampati classification 2 ,  ASA2   Heart:   RRR. Lungs:   CTAB. No wheezes, rales or rhonchi. The patient is an appropriate candidate to undergo the planned procedure and sedation.     Lance Hardy MD

## 2021-04-09 NOTE — PROCEDURES
RADIOLOGY POST PROCEDURE NOTE     April 9, 2021       11:26 AM     Preoperative Diagnosis:   Left level 3LN. Postoperative Diagnosis:  Ectopic thyroid tissue. :  Dr. Harjinder Allen    Assistant:  None. Type of Anesthesia: 1% plain lidocaine    Procedure/Description:  Image guided core needle Bx of the left level 3 mass/adenopathy. Findings:   No bleeding. Suggestive of ectopic thyroid. No RPMI needed as per pathology. Estimated blood Loss:  Minimal    Specimen Removed:   yes    Blood transfusions:  None. Implants:  None.     Complications: None    Condition: Stable    Discharge Plan:  discharge home     Priti Rachel MD

## 2021-04-19 ENCOUNTER — TELEPHONE (OUTPATIENT)
Age: 31
End: 2021-04-19

## 2021-04-19 ENCOUNTER — NURSE NAVIGATOR (OUTPATIENT)
Dept: OTHER | Age: 31
End: 2021-04-19

## 2021-04-19 ENCOUNTER — OFFICE VISIT (OUTPATIENT)
Age: 31
End: 2021-04-19
Payer: MEDICAID

## 2021-04-19 ENCOUNTER — HOSPITAL ENCOUNTER (OUTPATIENT)
Dept: LAB | Age: 31
Discharge: HOME | End: 2021-04-19

## 2021-04-19 VITALS
SYSTOLIC BLOOD PRESSURE: 105 MMHG | RESPIRATION RATE: 14 BRPM | OXYGEN SATURATION: 99 % | BODY MASS INDEX: 28.35 KG/M2 | WEIGHT: 160 LBS | HEIGHT: 63 IN | TEMPERATURE: 97.7 F | HEART RATE: 82 BPM | DIASTOLIC BLOOD PRESSURE: 77 MMHG

## 2021-04-19 DIAGNOSIS — K86.89 PANCREATIC MASS: ICD-10-CM

## 2021-04-19 DIAGNOSIS — J39.2 NASOPHARYNGEAL MASS: Primary | ICD-10-CM

## 2021-04-19 DIAGNOSIS — R59.1 LYMPHADENOPATHY: ICD-10-CM

## 2021-04-19 DIAGNOSIS — M89.9 BONE LESION: ICD-10-CM

## 2021-04-19 DIAGNOSIS — J39.2 NASOPHARYNGEAL MASS: ICD-10-CM

## 2021-04-19 DIAGNOSIS — R59.1 LYMPHADENOPATHY: Primary | ICD-10-CM

## 2021-04-19 LAB — XX-LABCORP SPECIMEN COL,LCBCF: NORMAL

## 2021-04-19 PROCEDURE — 99001 SPECIMEN HANDLING PT-LAB: CPT

## 2021-04-19 PROCEDURE — 99214 OFFICE O/P EST MOD 30 MIN: CPT | Performed by: INTERNAL MEDICINE

## 2021-04-19 NOTE — NURSE NAVIGATOR
Saw pt with Dr. Karime Mercer for f/u c/o joint pain. Pt referred to ENT Dr. Masoud Christopher, CT Head and MRI abdomen and T Spine ordered. Labs today, RTC in 2 weeks, all questions answered.

## 2021-04-19 NOTE — TELEPHONE ENCOUNTER
Referral sent to [facility]/ Dr. Nicholas Memos.  Phone [phone] Fax [phone]

## 2021-04-19 NOTE — PROGRESS NOTES
[facility]  [id number] [place] [address], [address]  [facility]  Office Phone: [phone]  Fax: [phone]      Reason for visit:  Lymphadenopthy    HPI:   Tanya Dixon is a 26 y.o.  female who I was asked to see in consultation at the request of Elina Raya NP and Ella Young for evaluation for lymphadenopathy. Patient said that she has been having this lymphadenopathy on the left side for at least 2 years now. Recently she has seen new 1 developing. I saw and examined patient today. She is awake alert oriented x3. On review of system she denies any fevers, chills, shortness of breath, nausea vomiting or abdominal pain. Reports enlarged lymph node on the neck. She has no unintentional weight loss. No drenching night sweats. She complains of pain on ankle shoulders and knees: Generalized joint pain. All other point of view of system have been reviewed and were negative. ECOG performance status 0. Independent with ADLs and IADLs    DX   Encounter Diagnoses   Name Primary?  Lymphadenopathy Yes    Pancreatic mass     Bone lesion     Nasopharyngeal mass         STAGE:      ONCOLOGY HISTORY:   3/15/2021: CT soft tissue neck w/contrast  showed  Lymph nodes: There is an enlarged left level 3 node present at the level of the hyoid bone. This measures 2.9 x 2.1 x 4.3 cm. This is enlarged from the prior study. There are several prominent to slightly enlarged level 5 nodes also present. The largest measures 2.6 x 1.9 x 1.3 cm. A left supraclavicular node measures 1.5 x 1.3 cm. There is a prominent level 2 right neck node present at the angle of the mandible with a short axis measurement of 9 mm.    4/07/21: PET/CT showed  Intermediate intensity metabolic activity associated with left cervical lymph nodes and a few retroperitoneal lymph nodes. These are nonspecific.  They are of uncertain etiology, indeterminant for benign or low-grade malignant adenopathy. Possibility of a lymphoma remains not excluded.     There is some prominence asymmetric metabolic activity in the upper pharynx. Prominent soft tissue in the midline nasopharyngeal region and asymmetric increased metabolic activity in the left tonsil. This again is nonspecific. May be related to remnant lymphoid tissue or reactive changes. ENT consultation may be beneficial for further investigation and to help exclude possibility of underlying pathology.     In the left upper quadrant there is modest focal metabolic activity between the stomach and spleen. Difficult to further localize this as at the tail of the  pancreas or in adjacent peripancreatic soft tissue, such as an underlying lymph node or other nodule. Volume averaging through a vessel also a possible false positive.  -Would suggest initial investigation with CT abdomen and pelvis with IV and oral  contrast.     Nonspecific slightly focal metabolic activity at G15 and T5. Potentially artifactual. Consider MRI correlation to exclude underlying bone lesion. 21:  LEFT CERVICAL LYMPH NODE, CORE BIOPSIES:  BENIGN-APPEARING THYROID PARENCHYMA       Past Medical History:   Diagnosis Date    Abnormal Papanicolaou smear of cervix 2012    colpo     Past Surgical History:   Procedure Laterality Date    HX BREAST LUMPECTOMY      rt breast lump removed    HX  SECTION  ,     failure to progress.      HX OTHER SURGICAL  2018?    history of something removed from neck     Social History     Socioeconomic History    Marital status: SINGLE     Spouse name: Not on file    Number of children: Not on file    Years of education: Not on file    Highest education level: Not on file   Occupational History    Occupation:    Tobacco Use    Smoking status: Never Smoker    Smokeless tobacco: Never Used    Tobacco comment: social smoker in the past.    Substance and Sexual Activity    Alcohol use: No     Alcohol/week: 0.0 standard drinks    Drug use: No    Sexual activity: Not Currently     Partners: Male     Birth control/protection: Abstinence     Family History   Problem Relation Age of Onset    Hypertension Mother     Asthma Mother     No Known Problems Father     Thyroid Disease Maternal Grandmother     Diabetes Paternal Grandmother     Asthma Paternal Grandmother     Heart Disease Paternal Grandmother        Current Outpatient Medications   Medication Sig Dispense Refill    ibuprofen (MOTRIN) 800 mg tablet Take 1 Tab by mouth every eight (8) hours as needed for Pain. 30 Tab 2       No Known Allergies    Review of Systems  As per HPI    Objective:  Physical Exam:  Visit Vitals  /77   Pulse 82   Temp 97.7 °F (36.5 °C) (Oral)   Resp 14   Ht 5' 3\" (1.6 m)   Wt 72.6 kg (160 lb)   SpO2 99%   BMI 28.34 kg/m²       General:  Alert, cooperative, no distress, appears stated age. Head:  Normocephalic, without obvious abnormality, atraumatic. Eyes:  Conjunctivae/corneas clear. PERRL, EOMs intact. Throat: Lips, mucosa, and tongue normal.    Neck: Supple, symmetrical, trachea midline, no adenopathy, thyroid: no enlargement/tenderness/nodules   Back:   Symmetric, no curvature. ROM normal. No CVA tenderness. Lungs:   Clear to auscultation bilaterally. Chest wall:  No tenderness or deformity. Heart:  Regular rate and rhythm, S1, S2 normal, no murmur, click, rub or gallop. Abdomen:   Soft, non-tender. Bowel sounds normal. No masses,  No organomegaly. Extremities: Extremities normal, atraumatic, no cyanosis or edema. Skin: Skin color, texture, turgor normal. No rashes or lesions. Lymph nodes: Cervical, supraclavicular, and axillary nodes normal.   Neurologic: CNII-XII intact.        Diagnostic Imaging     Results for orders placed during the hospital encounter of 03/15/21   CT NECK SOFT TISSUE W CONT    Narrative EXAM: CT NECK SOFT TISSUE W CONT    CLINICAL HISTORY/INDICATION: Left neck mass  -Additional: None    COMPARISON: 06/17/16    TECHNIQUE: A CT scan of the neck was performed. Axial images were obtained from  the skull base through the top of the chest.  The study was performed after the  administration of intravenous contrast material. Sagittal and coronal  reconstructions were performed. The patient received 96 cc of Isovue-300  intravenous contrast material without reported difficulty. One or more dose  reduction techniques were used on this CT: automated exposure control,  adjustment of the mAs and/or kVp according to patient size, and iterative  reconstruction techniques. The specific techniques used on this CT exam have  been documented in the patient's electronic medical record. Digital Imaging and  Communications in Medicine (DICOM) format image data are available to  nonaffiliated external healthcare facilities or entities on a secure, media  free, reciprocally searchable basis with patient authorization for at least a  12-month period after this study. _______________    FINDINGS:      Airway: Patent    Thyroid: Normal    Lymph nodes: There is an enlarged left level 3 node present at the level of the  hyoid bone. This measures 2.9 x 2.1 x 4.3 cm. This is enlarged from the prior  study. There are several prominent to slightly enlarged level 5 nodes also  present. The largest measures 2.6 x 1.9 x 1.3 cm. A left supraclavicular node  measures 1.5 x 1.3 cm. There is a prominent level 2 right neck node present at  the angle of the mandible with a short axis measurement of 9 mm. Soft tissues: Otherwise unremarkable    Skull base: Normal    Osseous structures: Intact    Other: Normal    _______________      Impression There are enlarged left neck nodes present. These have increased in size from  prior imaging. Tissue correlation is recommended to exclude a neoplastic  process. This could be performed under ultrasound guidance.        Lab Results  Lab Results   Component Value Date/Time    WBC 8.3 04/09/2021 08:06 AM    HGB 11.3 (L) 04/09/2021 08:06 AM    HCT 34.0 (L) 04/09/2021 08:06 AM    PLATELET 72 (L) 08/21/4182 08:06 AM    MCV 80.0 04/09/2021 08:06 AM       Lab Results   Component Value Date/Time    Sodium 140 04/09/2021 08:06 AM    Potassium 3.7 04/09/2021 08:06 AM    Chloride 111 04/09/2021 08:06 AM    CO2 24 04/09/2021 08:06 AM    Anion gap 5 04/09/2021 08:06 AM    Glucose 93 04/09/2021 08:06 AM    BUN 10 04/09/2021 08:06 AM    Creatinine 0.45 (L) 04/09/2021 08:06 AM    BUN/Creatinine ratio 22 (H) 04/09/2021 08:06 AM    GFR est AA >60 04/09/2021 08:06 AM    GFR est non-AA >60 04/09/2021 08:06 AM    Calcium 8.6 04/09/2021 08:06 AM    Alk. phosphatase 92 03/19/2021 12:00 AM    Protein, total 8.1 03/19/2021 12:00 AM    Albumin 4.4 03/19/2021 12:00 AM    Globulin 4.6 (H) 08/30/2020 11:40 AM    A-G Ratio 1.2 03/19/2021 12:00 AM    ALT (SGPT) 54 (H) 03/19/2021 12:00 AM   Follow-up with PCP for health maintenance    Assessment/Plan:  26 y.o. female  1. Lymphadenopathy  I had a long discussion with patient regarding her newly diagnosed lymphadenopathy. She denies any fevers, drenching night sweats, or weight loss. Biopsy of left cervical node showed benign thyroid tissue. PET scan showed multiple variable sized lymphadenopathy including a large tonsils L>>R  Patient might need an excisional biopsy of one of the lymph nodes since the core biopsy might have been sampling error    2. Anemia/uterine fibroid/menorrhagia:  Labs on 3/19/2021 showed ferritin 66, transferrin saturation 14%, HIV screening negative, vitamin B12 and folate normal. WBC 5.3, H&H 12.7/38.6, platelet 246. MCV 80. On 4/9/2021, WBC 8.3, H&H 11.3/34, platelets 72. Patient has iron deficiency as well as thrombocytopenia which has worsened. Her thrombocytopenia might be secondary to ibuprofen versus ITP versus other etiology. *Follow-up with OB/GYN      3. T5/T10 Lesion: Check MRI of the thoracic spine to better characterize.     4. Nasopharyngeal mass:   *Ordered CT face with contrast  *for to ENT at [facility] Dr. Clyde Yen    5.  Pancreatic tail mass  *Check MRI abdomen with and without contrast.      RTC 2 weeks    CC: Candelario Sloan NP  CC: Megan Mcclendon  Return in 2 weeks

## 2021-04-20 LAB
CRP SERPL-MCNC: 7 MG/L (ref 0–10)
ERYTHROCYTE [SEDIMENTATION RATE] IN BLOOD BY WESTERGREN METHOD: 57 MM/HR (ref 0–32)
RHEUMATOID FACT SERPL-ACNC: <10 IU/ML (ref 0–13.9)
SPECIMEN STATUS REPORT, ROLRST: NORMAL

## 2021-05-13 ENCOUNTER — HOSPITAL ENCOUNTER (OUTPATIENT)
Dept: MRI IMAGING | Age: 31
Discharge: HOME | End: 2021-05-13
Attending: INTERNAL MEDICINE
Payer: MEDICAID

## 2021-05-13 ENCOUNTER — HOSPITAL ENCOUNTER (OUTPATIENT)
Dept: CT IMAGING | Age: 31
Discharge: HOME | End: 2021-05-13
Attending: INTERNAL MEDICINE
Payer: MEDICAID

## 2021-05-13 VITALS — BODY MASS INDEX: 29.76 KG/M2 | WEIGHT: 168 LBS

## 2021-05-13 DIAGNOSIS — K86.89 PANCREATIC MASS: ICD-10-CM

## 2021-05-13 DIAGNOSIS — R59.1 LYMPHADENOPATHY: ICD-10-CM

## 2021-05-13 DIAGNOSIS — J39.2 NASOPHARYNGEAL MASS: ICD-10-CM

## 2021-05-13 DIAGNOSIS — M89.9 BONE LESION: ICD-10-CM

## 2021-05-13 PROCEDURE — A9577 INJ MULTIHANCE: HCPCS | Performed by: INTERNAL MEDICINE

## 2021-05-13 PROCEDURE — 74011250636 HC RX REV CODE- 250/636: Performed by: INTERNAL MEDICINE

## 2021-05-13 PROCEDURE — 74011000636 HC RX REV CODE- 636: Performed by: INTERNAL MEDICINE

## 2021-05-13 PROCEDURE — 74183 MRI ABD W/O CNTR FLWD CNTR: CPT

## 2021-05-13 PROCEDURE — 72157 MRI CHEST SPINE W/O & W/DYE: CPT

## 2021-05-13 PROCEDURE — 70491 CT SOFT TISSUE NECK W/DYE: CPT

## 2021-05-13 RX ADMIN — GADOBENATE DIMEGLUMINE 15 ML: 529 INJECTION, SOLUTION INTRAVENOUS at 18:45

## 2021-05-13 RX ADMIN — IOPAMIDOL 80 ML: 612 INJECTION, SOLUTION INTRAVENOUS at 16:36

## 2021-05-20 ENCOUNTER — DOCUMENTATION ONLY (OUTPATIENT)
Age: 31
End: 2021-05-20

## 2021-05-20 NOTE — PROGRESS NOTES
Peer to Peer completed for MRI Thoracic Spine and MRI Abdomen today at 1115. Both tests were approved. Approval # MRI Thoracic Spine [id number] and for the MRI Abd [id number].

## 2021-05-27 ENCOUNTER — OFFICE VISIT (OUTPATIENT)
Age: 31
End: 2021-05-27
Payer: MEDICAID

## 2021-05-27 VITALS
HEIGHT: 63 IN | OXYGEN SATURATION: 97 % | DIASTOLIC BLOOD PRESSURE: 81 MMHG | SYSTOLIC BLOOD PRESSURE: 115 MMHG | WEIGHT: 157 LBS | HEART RATE: 92 BPM | TEMPERATURE: 98 F | BODY MASS INDEX: 27.82 KG/M2

## 2021-05-27 DIAGNOSIS — M89.9 BONE LESION: ICD-10-CM

## 2021-05-27 DIAGNOSIS — K86.9 PANCREATIC LESION: ICD-10-CM

## 2021-05-27 DIAGNOSIS — R59.1 LYMPHADENOPATHY: Primary | ICD-10-CM

## 2021-05-27 DIAGNOSIS — J39.2 NASOPHARYNGEAL MASS: ICD-10-CM

## 2021-05-27 PROCEDURE — 99214 OFFICE O/P EST MOD 30 MIN: CPT | Performed by: INTERNAL MEDICINE

## 2021-05-27 RX ORDER — AMOXICILLIN AND CLAVULANATE POTASSIUM 875; 125 MG/1; MG/1
1 TABLET, FILM COATED ORAL 2 TIMES DAILY
Qty: 14 TABLET | Refills: 0 | Status: SHIPPED | OUTPATIENT
Start: 2021-05-27 | End: 2021-06-03

## 2021-05-27 NOTE — LETTER
5/27/2021 1:38 PM 
 
Ms. Armani Blount [address] [id number] Sincerely, 
 
 
Matty Gibson MD

## 2021-05-27 NOTE — LETTER
5/27/2021 Patient: Patricia Guajardo YOB: 1990 Date of Visit: 5/27/2021 Tereza Da Silva MD 
[address] [place] [id number] [address] [employer] Dear Tereza Da Silva MD, Thank you for referring Ms. Nandini Gray to [address] [place] [address] for evaluation. My notes for this consultation are attached. If you have questions, please do not hesitate to call me. I look forward to following your patient along with you.  
 
 
Sincerely, 
 
Bert Garcia MD

## 2021-05-27 NOTE — PROGRESS NOTES
[facility]  [address] [place] [address], [address]  [place] [facility] [address]  Office Phone: [phone]  Fax: [phone]      Reason for visit:  Lymphadenopthy    HPI:   Latanya Cantor is a 26 y.o.  female who I was asked to see in consultation at the request of Juan Antonio Carmichael NP and Grady Stern for evaluation for lymphadenopathy. Patient said that she has been having this lymphadenopathy on the left side for at least 2 years now. Recently she has seen new 1 developing. I saw and examined patient today. She is awake alert oriented x3. On review of system she denies any fevers, chills, shortness of breath, nausea vomiting or abdominal pain. She has no unintentional weight loss. No drenching night sweats. She complains of pain on ankle shoulders and knees: Generalized joint pain. All other point of view of system have been reviewed and were negative. ECOG performance status 0. Independent with ADLs and IADLs    DX   Encounter Diagnoses   Name Primary?  Lymphadenopathy Yes    Bone lesion     Pancreatic lesion     Nasopharyngeal mass         STAGE:      ONCOLOGY HISTORY:   3/15/2021: CT soft tissue neck w/contrast  showed  Lymph nodes: There is an enlarged left level 3 node present at the level of the hyoid bone. This measures 2.9 x 2.1 x 4.3 cm. This is enlarged from the prior study. There are several prominent to slightly enlarged level 5 nodes also present. The largest measures 2.6 x 1.9 x 1.3 cm. A left supraclavicular node measures 1.5 x 1.3 cm. There is a prominent level 2 right neck node present at the angle of the mandible with a short axis measurement of 9 mm.    4/07/21: PET/CT showed  Intermediate intensity metabolic activity associated with left cervical lymph nodes and a few retroperitoneal lymph nodes. These are nonspecific. They are of uncertain etiology, indeterminant for benign or low-grade malignant adenopathy.  Possibility of a lymphoma remains not excluded.     There is some prominence asymmetric metabolic activity in the upper pharynx. Prominent soft tissue in the midline nasopharyngeal region and asymmetric increased metabolic activity in the left tonsil. This again is nonspecific. May be related to remnant lymphoid tissue or reactive changes. ENT consultation may be beneficial for further investigation and to help exclude possibility of underlying pathology.     In the left upper quadrant there is modest focal metabolic activity between the stomach and spleen. Difficult to further localize this as at the tail of the  pancreas or in adjacent peripancreatic soft tissue, such as an underlying lymph node or other nodule. Volume averaging through a vessel also a possible false positive.  -Would suggest initial investigation with CT abdomen and pelvis with IV and oral  contrast.     Nonspecific slightly focal metabolic activity at E96 and T5. Potentially artifactual. Consider MRI correlation to exclude underlying bone lesion. 4/09/21:  LEFT CERVICAL LYMPH NODE, CORE BIOPSIES:  BENIGN-APPEARING THYROID PARENCHYMA     5/13/2021:.  CT soft tissue neck with contrast showed The dominant left cervical lymph node is mildly smaller but other enlarged  lymph nodes are more significantly smaller. 2.  No asymmetry of the palatine tonsils to correspond with asymmetric uptake. No clear focal mass. Similar prominence of the adenoids. *MRI of the abdomen with contrast was negative for any lymphadenopathy or malignant disease. She has some benign liver hemangiomas  *MRI thoracic spine with and without contrast showed  1. Confirmation of a small lesion at the right posterior quadrant of T5 vertebra (5-6 mm). Nonspecific. Metastatic disease versus atypical hemangioma. No surrounding edema or cortical disruption. 2. No abnormal marrow signal in T10.  3. Normal alignment.  No compression fracture or significant disc disease.         Past Medical History:   Diagnosis Date    Abnormal Papanicolaou smear of cervix 2012    colpo     Past Surgical History:   Procedure Laterality Date    HX BREAST LUMPECTOMY  2014    rt breast lump removed    HX  SECTION  ,     failure to progress.  HX OTHER SURGICAL  2018?    history of something removed from neck     Social History     Socioeconomic History    Marital status: SINGLE     Spouse name: Not on file    Number of children: Not on file    Years of education: Not on file    Highest education level: Not on file   Occupational History    Occupation:    Tobacco Use    Smoking status: Never Smoker    Smokeless tobacco: Never Used    Tobacco comment: social smoker in the past.    Vaping Use    Vaping Use: Never used   Substance and Sexual Activity    Alcohol use: No     Alcohol/week: 0.0 standard drinks    Drug use: No    Sexual activity: Not Currently     Partners: Male     Birth control/protection: Abstinence     Social Determinants of Health     Financial Resource Strain:     Difficulty of Paying Living Expenses:    Food Insecurity:     Worried About Running Out of Food in the Last Year:     Ran Out of Food in the Last Year:    Transportation Needs:     Lack of Transportation (Medical):      Lack of Transportation (Non-Medical):    Physical Activity:     Days of Exercise per Week:     Minutes of Exercise per Session:    Stress:     Feeling of Stress :    Social Connections:     Frequency of Communication with Friends and Family:     Frequency of Social Gatherings with Friends and Family:     Attends Advent Services:     Active Member of Clubs or Organizations:     Attends Club or Organization Meetings:     Marital Status:      Family History   Problem Relation Age of Onset    Hypertension Mother     Asthma Mother     No Known Problems Father     Thyroid Disease Maternal Grandmother     Diabetes Paternal Grandmother     Asthma Paternal Grandmother     Heart Disease Paternal Grandmother        Current Outpatient Medications   Medication Sig Dispense Refill    ibuprofen (MOTRIN) 800 mg tablet Take 1 Tab by mouth every eight (8) hours as needed for Pain. 30 Tab 2       No Known Allergies    Review of Systems  As per HPI    Objective:  Physical Exam:  There were no vitals taken for this visit. General:  Alert, cooperative, no distress, appears stated age. Head:  Normocephalic, without obvious abnormality, atraumatic. Eyes:  Conjunctivae/corneas clear. PERRL, EOMs intact. Throat: Lips, mucosa, and tongue normal.    Neck: Supple, symmetrical, trachea midline, no adenopathy, thyroid: no enlargement/tenderness/nodules   Back:   Symmetric, no curvature. ROM normal. No CVA tenderness. Lungs:   Clear to auscultation bilaterally. Chest wall:  No tenderness or deformity. Heart:  Regular rate and rhythm, S1, S2 normal, no murmur, click, rub or gallop. Abdomen:   Soft, non-tender. Bowel sounds normal. No masses,  No organomegaly. Extremities: Extremities normal, atraumatic, no cyanosis or edema. Skin: Skin color, texture, turgor normal. No rashes or lesions. Lymph nodes: Cervical, supraclavicular, and axillary nodes normal.   Neurologic: CNII-XII intact.        Diagnostic Imaging     Results for orders placed during the hospital encounter of 05/13/21    CT NECK SOFT TISSUE W CONT    Narrative  CT OF THE NECK WITH IV CONTRAST    HISTORY:Nasopharyngeal mass/protuberance on PET/CT    COMPARISON:3/15/2021    TECHNIQUE:  A series of contiguous helical scans were performed through the neck  following bolus injection of nonionic contrast 80 mL Isovue-300 contrast .    All CT scans at this facility are performed using dose optimization technique as  appropriate to a performed exam, to include automated exposure control,  adjustment of the mA and/or kV according to patient size (including appropriate  matching first site-specific examinations), or use of iterative reconstruction  technique. RESULT:    Limited visualized brain parenchyma normal. Orbits normal. Nasal cavity normal  except for asymmetric edema at the left nasal turbinates. There is similar  midline thickening at the level of the adenoids midline pharynx at the level of  the nasal cavity. [place] tonsillar prominence is unchanged. Left cervical  level 3 node at the level of the hyoid bone is 2.4 x 2.1 x 3.8 cm, previously  2.9 x 2.1 x 4.3 cm on 3/15/2021. It is markedly enhancing. A prior node  posterior to this is 1 x 0.8 cm (series 2, image 53), previously 1.9 x 1.3 cm. A  left supraclavicular node is 1 x 0.8 cm, previously 1.5 x 1.3 cm no significant  right cervical lymph nodes. Parotid and submandibular glands are normal.    Limited visualized lung fields are normal. Thyroid is symmetric. No suspicious bone findings. Impression  1. The dominant left cervical lymph node is mildly smaller but other enlarged  lymph nodes are more significantly smaller. 2.  No asymmetry of the palatine tonsils to correspond with asymmetric uptake. No clear focal mass. Similar prominence of the adenoids. Lab Results  Lab Results   Component Value Date/Time    WBC 8.3 04/09/2021 08:06 AM    HGB 11.3 (L) 04/09/2021 08:06 AM    HCT 34.0 (L) 04/09/2021 08:06 AM    PLATELET 72 (L) 83/83/9006 08:06 AM    MCV 80.0 04/09/2021 08:06 AM       Lab Results   Component Value Date/Time    Sodium 140 04/09/2021 08:06 AM    Potassium 3.7 04/09/2021 08:06 AM    Chloride 111 04/09/2021 08:06 AM    CO2 24 04/09/2021 08:06 AM    Anion gap 5 04/09/2021 08:06 AM    Glucose 93 04/09/2021 08:06 AM    BUN 10 04/09/2021 08:06 AM    Creatinine 0.45 (L) 04/09/2021 08:06 AM    BUN/Creatinine ratio 22 (H) 04/09/2021 08:06 AM    GFR est AA >60 04/09/2021 08:06 AM    GFR est non-AA >60 04/09/2021 08:06 AM    Calcium 8.6 04/09/2021 08:06 AM    Alk.  phosphatase 92 03/19/2021 12:00 AM    Protein, total 8.1 03/19/2021 12:00 AM    Albumin 4.4 03/19/2021 12:00 AM    Globulin 4.6 (H) 08/30/2020 11:40 AM    A-G Ratio 1.2 03/19/2021 12:00 AM    ALT (SGPT) 54 (H) 03/19/2021 12:00 AM   Follow-up with PCP for health maintenance    Assessment/Plan:  26 y.o. female  1. Lymphadenopathy  I had a long discussion with patient regarding her newly diagnosed lymphadenopathy. She denies any fevers, drenching night sweats, or weight loss. Biopsy of left cervical node showed benign thyroid tissue. PET scan showed multiple variable sized lymphadenopathy including a large tonsils L>>R  CT soft tissue neck done on 5/13/2021 showed actually decreased size of the most dominant lymph node. MRI of the abdomen was unremarkable for any primary or metastatic neoplasm. MRI of the thoracic spine showed a nonspecific 5 to 6 mm lesion at T5 possibly hemangioma. 2.  Anemia/uterine fibroid/menorrhagia:  Labs on 3/19/2021 showed ferritin 66, transferrin saturation 14%, HIV screening negative, vitamin B12 and folate normal. WBC 5.3, H&H 12.7/38.6, platelet 663. MCV 80. On 4/9/2021, WBC 8.3, H&H 11.3/34, platelets 72. Patient has iron deficiency as well as thrombocytopenia which has worsened. Her thrombocytopenia might be secondary to ibuprofen versus ITP versus other etiology. *Give oral iron  *Follow-up with OB/GYN      3. T5/T10 Lesion: Nonspecific 5 to 6 mm lesion. Continue follow-up and surveillance. 4. Nasopharyngeal mass:   *CT soft tissue neck did not show any clear pharyngeal mass. Most prominent lymph node decreasing in size. *for to ENT at [facility] Dr. Enoch Mancini    5. Pancreatic tail mass  *Essentially negative MRI of the abdomen. Overall I do not think that her decreasing size of lymphadenopathy are due to malignancy. These are likely reactive but I will await for ENT input. Patient would benefit from rheumatology consult. I would defer to Dr. Lamont catalan or Jasmin Caceres NP to refer to Rheumatology.   I will meanwhile for the cervical lymphadenopathy give her Augmentin 875 mg Po daily for 7 days    CC:Natalee Vega NP  CC:       RTC 2 weeks    CC: Conrad Bills NP  CC: Johana Martinez    Return in July 8, 2021 since patient is going to [facility] for  a wedding

## 2021-05-27 NOTE — Clinical Note
NOTIFICATION RETURN TO WORK / SCHOOL 
 
5/27/2021 1:38 PM 
 
Ms. Jihan Rowe [address] [place] [id number] To Whom It May Concern: 
 
Jihan Rowe is currently under the care of [facility]. She will return to work/school on: *** If there are questions or concerns please have the patient contact our office.  
 
 
 
Sincerely, 
 
 
Ethel Crouch MD

## 2021-07-08 ENCOUNTER — TELEPHONE (OUTPATIENT)
Age: 31
End: 2021-07-08

## 2021-09-27 ENCOUNTER — HOSPITAL ENCOUNTER (OUTPATIENT)
Dept: LAB | Age: 31
Discharge: HOME | End: 2021-09-27

## 2021-09-27 ENCOUNTER — OFFICE VISIT (OUTPATIENT)
Dept: FAMILY MEDICINE CLINIC | Age: 31
End: 2021-09-27

## 2021-09-27 VITALS
BODY MASS INDEX: 28.06 KG/M2 | WEIGHT: 158.4 LBS | DIASTOLIC BLOOD PRESSURE: 86 MMHG | HEART RATE: 85 BPM | OXYGEN SATURATION: 99 % | SYSTOLIC BLOOD PRESSURE: 124 MMHG | TEMPERATURE: 98 F | RESPIRATION RATE: 20 BRPM

## 2021-09-27 DIAGNOSIS — R06.09 DYSPNEA ON EXERTION: ICD-10-CM

## 2021-09-27 DIAGNOSIS — I73.00 RAYNAUD'S PHENOMENON WITHOUT GANGRENE: ICD-10-CM

## 2021-09-27 DIAGNOSIS — M79.10 MYALGIA: ICD-10-CM

## 2021-09-27 DIAGNOSIS — R53.1 WEAKNESS: ICD-10-CM

## 2021-09-27 LAB — XX-LABCORP SPECIMEN COL,LCBCF: NORMAL

## 2021-09-27 PROCEDURE — 99001 SPECIMEN HANDLING PT-LAB: CPT

## 2021-09-27 PROCEDURE — 99214 OFFICE O/P EST MOD 30 MIN: CPT | Performed by: STUDENT IN AN ORGANIZED HEALTH CARE EDUCATION/TRAINING PROGRAM

## 2021-09-27 NOTE — PROGRESS NOTES
Philippe Calix is a 26 y.o.  female and presents with    Chief Complaint   Patient presents with    Follow-up     Body pain           Subjective: Body pain  Started having pain in her fingers. It hurt to bend them. They get worse as the day passes. Feels like she does not have the strenght in her arms. Gets pain in joints like ankles and knee, and they get swollen. Patient has also been noticing that when she gets really cold especially the pain is in her hands turn white. Patient has a picture of this with her, which she shows me where her left pinky was completely white compared to the rest of her hand there was pink. Went to the Ed, dt epigastric pain, that radated towards the L side. Has been feeling dyspnea upon exertion. States that this has been going on for about 5 months. States that she has had to stop doing activities that she was previously doing such as singing in the choir, exercising, and has noted that even walks that she used to do before she is not able to do without stopping and resting. 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      HX OTHER SURGICAL  ?    history of something removed from neck      Family History   Problem Relation Age of Onset   [employer] Hypertension Mother     Asthma Mother     No Known Problems Father     Thyroid Disease Maternal Grandmother     Diabetes Paternal Grandmother     Asthma Paternal Grandmother     Heart Disease Paternal Grandmother      Social History     Socioeconomic History    Marital status: SINGLE     Spouse name: Not on file    Number of children: Not on file    Years of education: Not on file    Highest education level: Not on file   Occupational History    Occupation:    Tobacco Use    Smoking status: Never Smoker    Smokeless tobacco: Never Used    Tobacco comment: social smoker in the past.    Vaping Use    Vaping Use: Never used   Substance and Sexual Activity    Alcohol use: No     Alcohol/week: 0.0 standard drinks    Drug use: No    Sexual activity: Not Currently     Partners: Male     Birth control/protection: Abstinence   Other Topics Concern    Not on file   Social History Narrative    Not on file     Social Determinants of Health     Financial Resource Strain:     Difficulty of Paying Living Expenses:    Food Insecurity:     Worried About [address] in the Last Year:     [address] in the Last Year:    Transportation Needs:     Lack of Transportation (Medical):  Lack of Transportation (Non-Medical):    Physical Activity:     Days of Exercise per Week:     Minutes of Exercise per Session:    Stress:     Feeling of Stress :    Social Connections:     Frequency of Communication with Friends and Family:     Frequency of Social Gatherings with Friends and Family:     Attends Alevism Services:     Active Member of Clubs or Organizations:     Attends Club or Organization Meetings:     Marital Status:    Intimate Partner Violence:     Fear of Current or Ex-Partner:     Emotionally Abused:     Physically Abused:     Sexually Abused:         Current Outpatient Medications   Medication Sig Dispense Refill    ibuprofen (MOTRIN) 800 mg tablet Take 1 Tab by mouth every eight (8) hours as needed for Pain. (Patient not taking: Reported on 5/27/2021) 30 Tab 2        ROS   Review of Systems   Constitutional: Negative for chills, fever and malaise/fatigue. HENT: Negative for congestion, ear discharge and ear pain. Eyes: Negative for blurred vision, pain and discharge.    Respiratory: Negative for cough and shortness of breath. Cardiovascular: Negative for chest pain and palpitations. Gastrointestinal: Negative for abdominal pain, nausea and vomiting. Genitourinary: Negative for dysuria, frequency and urgency. Musculoskeletal: Positive for myalgias. Skin: Negative for itching and rash. Neurological: Positive for focal weakness. Negative for dizziness, seizures, loss of consciousness and headaches. Psychiatric/Behavioral: Negative for substance abuse. Objective:  Vitals:    09/27/21 1149   BP: 124/86   Pulse: 85   Resp: 20   Temp: 98 °F (36.7 °C)   SpO2: 99%   Weight: 158 lb 6.4 oz (71.8 kg)   PainSc:  10 - Worst pain ever   PainLoc: Generalized   LMP: 08/26/2021       Physical Exam  Vitals reviewed. Constitutional:       Appearance: Normal appearance. Eyes:      General: No scleral icterus. Right eye: No discharge. Left eye: No discharge. Cardiovascular:      Rate and Rhythm: Normal rate and regular rhythm. Pulses: Normal pulses. Pulmonary:      Effort: Pulmonary effort is normal. No respiratory distress. Breath sounds: Normal breath sounds. Musculoskeletal:         General: No swelling or tenderness. Normal range of motion. Cervical back: Normal range of motion and neck supple. Skin:     General: Skin is warm and dry. Neurological:      Mental Status: She is alert and oriented to person, place, and time. Cranial Nerves: No cranial nerve deficit. Psychiatric:         Mood and Affect: Mood normal.         Behavior: Behavior normal.         Thought Content: Thought content normal.         Judgment: Judgment normal.           LABS     TESTS      Assessment/Plan:    1. Myalgia  - SHUBHAM COMPREHENSIVE PANEL; Future  - METABOLIC PANEL, COMPREHENSIVE; Future  - CK; Future    2. Weakness  Will evaluate SHUBHAM and CK    3. Dyspnea on exertion  - ECHO ADULT COMPLETE; Future    4.  Raynaud's phenomenon without gangrene  - SHUBHAM COMPREHENSIVE PANEL; Future  - METABOLIC PANEL, COMPREHENSIVE; Future  - CK; Future    Lab review: orders written for new lab studies as appropriate; see orders      I have discussed the diagnosis with the patient and the intended plan as seen in the above orders. The patient has received an after-visit summary and questions were answered concerning future plans. I have discussed medication side effects and warnings with the patient as well. I have reviewed the plan of care with the patient, accepted their input and they are in agreement with the treatment goals.          Edy Galvez MD

## 2021-09-27 NOTE — PROGRESS NOTES
Jayden Alfonso presents today for   Chief Complaint   Patient presents with    Follow-up     Body pain       Is someone accompanying this pt? no    Is the patient using any DME equipment during OV? no    Depression Screening:  3 most recent PHQ Screens 9/27/2021   Little interest or pleasure in doing things Not at all   Feeling down, depressed, irritable, or hopeless Not at all   Total Score PHQ 2 0       Learning Assessment:  Learning Assessment 5/10/2016   PRIMARY LEARNER Patient   PRIMARY LANGUAGE Estonian   LEARNER PREFERENCE PRIMARY DEMONSTRATION   ANSWERED BY patient   RELATIONSHIP SELF       Abuse Screening:  Abuse Screening Questionnaire 5/10/2016   Do you ever feel afraid of your partner? N   Are you in a relationship with someone who physically or mentally threatens you? N   Is it safe for you to go home? Y       Fall Risk  Fall Risk Assessment, last 12 mths 3/4/2021   Able to walk? Yes   Fall in past 12 months? 0       Health Maintenance reviewed and discussed and ordered per Provider. Health Maintenance Due   Topic Date Due    COVID-19 Vaccine (1) Never done    DTaP/Tdap/Td series (1 - Tdap) Never done    Cervical cancer screen  03/30/2021    Flu Vaccine (1) Never done   . Coordination of Care:  1. Have you been to the ER, urgent care clinic since your last visit? Hospitalized since your last visit? no    2. Have you seen or consulted any other health care providers outside of the [address] [place] [address] since your last visit? Include any pap smears or colon screening.  no      Last  Checked na  Last UDS Checked na  Last Pain contract signed: na

## 2021-09-28 LAB
ALBUMIN SERPL-MCNC: 4.2 G/DL (ref 3.8–4.8)
ALBUMIN/GLOB SERPL: 0.9 {RATIO} (ref 1.2–2.2)
ALP SERPL-CCNC: 101 IU/L (ref 44–121)
ALT SERPL-CCNC: 17 IU/L (ref 0–32)
AST SERPL-CCNC: 19 IU/L (ref 0–40)
BILIRUB SERPL-MCNC: 0.3 MG/DL (ref 0–1.2)
BUN SERPL-MCNC: 9 MG/DL (ref 6–20)
BUN/CREAT SERPL: 18 (ref 9–23)
CALCIUM SERPL-MCNC: 9.2 MG/DL (ref 8.7–10.2)
CENTROMERE B AB SER-ACNC: <0.2 AI (ref 0–0.9)
CHLORIDE SERPL-SCNC: 102 MMOL/L (ref 96–106)
CHROMATIN AB SERPL-ACNC: >8 AI (ref 0–0.9)
CK SERPL-CCNC: 36 U/L (ref 32–182)
CO2 SERPL-SCNC: 21 MMOL/L (ref 20–29)
CREAT SERPL-MCNC: 0.49 MG/DL (ref 0.57–1)
DSDNA AB SER-ACNC: 75 IU/ML (ref 0–9)
ENA JO1 AB SER-ACNC: <0.2 AI (ref 0–0.9)
ENA RNP AB SER-ACNC: 4.9 AI (ref 0–0.9)
ENA SCL70 AB SER-ACNC: <0.2 AI (ref 0–0.9)
ENA SM AB SER-ACNC: >8 AI (ref 0–0.9)
ENA SS-A AB SER-ACNC: 5.9 AI (ref 0–0.9)
ENA SS-B AB SER-ACNC: 0.2 AI (ref 0–0.9)
GLOBULIN SER CALC-MCNC: 4.5 G/DL (ref 1.5–4.5)
GLUCOSE SERPL-MCNC: 81 MG/DL (ref 65–99)
POTASSIUM SERPL-SCNC: 4.2 MMOL/L (ref 3.5–5.2)
PROT SERPL-MCNC: 8.7 G/DL (ref 6–8.5)
SEE BELOW, 164869: ABNORMAL
SODIUM SERPL-SCNC: 138 MMOL/L (ref 134–144)

## 2021-09-30 DIAGNOSIS — M32.9 LUPUS (HCC): ICD-10-CM

## 2021-09-30 DIAGNOSIS — I73.00 RAYNAUD'S PHENOMENON WITHOUT GANGRENE: ICD-10-CM

## 2021-09-30 DIAGNOSIS — R76.8 ANA POSITIVE: Primary | ICD-10-CM

## 2021-09-30 RX ORDER — PREDNISONE 20 MG/1
20 TABLET ORAL
Qty: 20 TABLET | Refills: 0 | Status: SHIPPED | OUTPATIENT
Start: 2021-09-30 | End: 2021-10-18 | Stop reason: SDUPTHER

## 2021-09-30 NOTE — PROGRESS NOTES
Called patient and went over results with her. Pt states she is on significant pain still. Will order some steroids, and will place rheumatology referral. She verbalized understanding. Statement Selected

## 2021-10-08 ENCOUNTER — HOSPITAL ENCOUNTER (OUTPATIENT)
Dept: NON INVASIVE DIAGNOSTICS | Age: 31
Discharge: HOME | End: 2021-10-08
Attending: STUDENT IN AN ORGANIZED HEALTH CARE EDUCATION/TRAINING PROGRAM
Payer: MEDICAID

## 2021-10-08 VITALS
WEIGHT: 158 LBS | HEIGHT: 63 IN | BODY MASS INDEX: 28 KG/M2 | DIASTOLIC BLOOD PRESSURE: 86 MMHG | SYSTOLIC BLOOD PRESSURE: 124 MMHG

## 2021-10-08 DIAGNOSIS — R06.09 DYSPNEA ON EXERTION: ICD-10-CM

## 2021-10-08 LAB
ECHO AO ROOT DIAM: 3.02 CM
ECHO LA AREA 4C: 11.9 CM2
ECHO LA VOL 2C: 49.58 ML (ref 22–52)
ECHO LA VOL 4C: 20.28 ML (ref 22–52)
ECHO LA VOL BP: 34.89 ML (ref 22–52)
ECHO LA VOL/BSA BIPLANE: 19.94 ML/M2 (ref 16–28)
ECHO LA VOLUME INDEX A2C: 28.33 ML/M2 (ref 16–28)
ECHO LA VOLUME INDEX A4C: 11.59 ML/M2 (ref 16–28)
ECHO LV INTERNAL DIMENSION DIASTOLIC: 4.57 CM (ref 3.9–5.3)
ECHO LV INTERNAL DIMENSION SYSTOLIC: 2.9 CM
ECHO LV IVSD: 0.92 CM (ref 0.6–0.9)
ECHO LV MASS 2D: 116.6 G (ref 67–162)
ECHO LV MASS INDEX 2D: 66.6 G/M2 (ref 43–95)
ECHO LV POSTERIOR WALL DIASTOLIC: 0.68 CM (ref 0.6–0.9)
ECHO LVOT DIAM: 2 CM
ECHO LVOT PEAK GRADIENT: 2.83 MMHG
ECHO LVOT PEAK VELOCITY: 84.1 CM/S
ECHO LVOT SV: 53.1 ML
ECHO LVOT VTI: 16.93 CM
ECHO MV A VELOCITY: 54.8 CM/S
ECHO MV E DECELERATION TIME (DT): 190.31 MS
ECHO MV E VELOCITY: 74.05 CM/S
ECHO MV E/A RATIO: 1.35
ECHO TV REGURGITANT MAX VELOCITY: 185.68 CM/S
ECHO TV REGURGITANT PEAK GRADIENT: 13.79 MMHG
LVOT MG: 1.68 MMHG

## 2021-10-08 PROCEDURE — 93306 TTE W/DOPPLER COMPLETE: CPT

## 2021-10-18 ENCOUNTER — OFFICE VISIT (OUTPATIENT)
Dept: FAMILY MEDICINE CLINIC | Age: 31
End: 2021-10-18
Payer: MEDICAID

## 2021-10-18 ENCOUNTER — HOSPITAL ENCOUNTER (OUTPATIENT)
Dept: LAB | Age: 31
Discharge: HOME | End: 2021-10-18
Payer: MEDICAID

## 2021-10-18 VITALS
OXYGEN SATURATION: 100 % | SYSTOLIC BLOOD PRESSURE: 119 MMHG | DIASTOLIC BLOOD PRESSURE: 92 MMHG | RESPIRATION RATE: 20 BRPM | TEMPERATURE: 98 F | BODY MASS INDEX: 28.63 KG/M2 | HEART RATE: 110 BPM | WEIGHT: 161.6 LBS

## 2021-10-18 DIAGNOSIS — M62.838 MUSCLE SPASM: ICD-10-CM

## 2021-10-18 DIAGNOSIS — R06.09 DYSPNEA ON EXERTION: ICD-10-CM

## 2021-10-18 DIAGNOSIS — M32.9 LUPUS (HCC): ICD-10-CM

## 2021-10-18 DIAGNOSIS — R76.8 ANA POSITIVE: Primary | ICD-10-CM

## 2021-10-18 LAB
ATRIAL RATE: 94 BPM
CALCULATED P AXIS, ECG09: 55 DEGREES
CALCULATED R AXIS, ECG10: 33 DEGREES
CALCULATED T AXIS, ECG11: 45 DEGREES
DIAGNOSIS, 93000: NORMAL
P-R INTERVAL, ECG05: 142 MS
Q-T INTERVAL, ECG07: 338 MS
QRS DURATION, ECG06: 74 MS
QTC CALCULATION (BEZET), ECG08: 422 MS
VENTRICULAR RATE, ECG03: 94 BPM

## 2021-10-18 PROCEDURE — 93005 ELECTROCARDIOGRAM TRACING: CPT

## 2021-10-18 PROCEDURE — 99214 OFFICE O/P EST MOD 30 MIN: CPT | Performed by: STUDENT IN AN ORGANIZED HEALTH CARE EDUCATION/TRAINING PROGRAM

## 2021-10-18 RX ORDER — BACLOFEN 10 MG/1
10 TABLET ORAL
Qty: 30 TABLET | Refills: 0 | Status: SHIPPED | OUTPATIENT
Start: 2021-10-18

## 2021-10-18 RX ORDER — PREDNISONE 5 MG/1
5 TABLET ORAL DAILY
Qty: 11 TABLET | Refills: 0 | Status: SHIPPED | OUTPATIENT
Start: 2021-11-08

## 2021-10-18 RX ORDER — PREDNISONE 20 MG/1
TABLET ORAL
Qty: 25 TABLET | Refills: 0 | Status: SHIPPED | OUTPATIENT
Start: 2021-10-18

## 2021-10-18 NOTE — PROGRESS NOTES
Jelena Roberson is a 26 y.o.  female and presents with    Chief Complaint   Patient presents with    Follow-up    Results           Subjective:      Patient states that she took the prednisone as indicated. Did feel like there was slight releasing her myalgias, however she continues to have the symptoms of the chest pain and shortness of breath. Patient states that rheumatology has not been in touch with her yet. Patient did had a positive test for SHUBHAM, antidouble-stranded antibodies Smith antibodies, RNP antibodies, Sjogren's anti-SSA antibodies, and antichromatin antibodies. Since April has been having like a spasm sensation on her chest. Radiates from her back to the left aspect of her chest.  And it makes her stop in her tracks when this happens     SOB - dyspnea on exertion. Sometimes painful to take a deep breath of air. Had echo done. 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      HX OTHER SURGICAL  2018?    history of something removed from neck      Family History   Problem Relation Age of Onset   [facility] Hypertension Mother     Asthma Mother     No Known Problems Father     Thyroid Disease Maternal Grandmother     Diabetes Paternal Grandmother     Asthma Paternal Grandmother     Heart Disease Paternal Grandmother      Social History     Socioeconomic History    Marital status: SINGLE     Spouse name: Not on file    Number of children: Not on file    Years of education: Not on file    Highest education level: Not on file   Occupational History    Occupation:    Tobacco Use    Smoking status: Never Smoker    Smokeless tobacco: Never Used    Tobacco comment: social smoker in the past.    Vaping Use    Vaping Use: Never used   Substance and Sexual Activity    Alcohol use: No     Alcohol/week: 0.0 standard drinks    Drug use: No    Sexual activity: Not Currently     Partners: Male     Birth control/protection: Abstinence   Other Topics Concern    Not on file   Social History Narrative    Not on file     Social Determinants of Health     Financial Resource Strain:     Difficulty of Paying Living Expenses:    Food Insecurity:     Worried About Running Out of Food in the Last Year:     Ran Out of Food in the Last Year:    Transportation Needs:     Lack of Transportation (Medical):  Lack of Transportation (Non-Medical):    Physical Activity:     Days of Exercise per Week:     Minutes of Exercise per Session:    Stress:     Feeling of Stress :    Social Connections:     Frequency of Communication with Friends and Family:     Frequency of Social Gatherings with Friends and Family:     Attends Oriental orthodox Services:     Active Member of Clubs or Organizations:     Attends Club or Organization Meetings:     Marital Status:    Intimate Partner Violence:     Fear of Current or Ex-Partner:     Emotionally Abused:     Physically Abused:     Sexually Abused:         Current Outpatient Medications   Medication Sig Dispense Refill    predniSONE (DELTASONE) 20 mg tablet Take 20 mg by mouth daily (with breakfast) for 20 days. (Patient not taking: Reported on 10/18/2021) 20 Tablet 0    ibuprofen (MOTRIN) 800 mg tablet Take 1 Tab by mouth every eight (8) hours as needed for Pain. (Patient not taking: Reported on 5/27/2021) 30 Tab 2        ROS   Review of Systems   Constitutional: Negative for chills, fever and malaise/fatigue. HENT: Negative for congestion, ear discharge and ear pain. Eyes: Negative for blurred vision, pain and discharge.    Respiratory: Positive for shortness of breath. Negative for cough. Cardiovascular: Negative for chest pain and palpitations. Gastrointestinal: Negative for abdominal pain, nausea and vomiting. Genitourinary: Negative for dysuria, frequency and urgency. Musculoskeletal: Positive for myalgias. Skin: Negative for itching and rash. Neurological: Positive for focal weakness. Negative for dizziness, seizures, loss of consciousness and headaches. Psychiatric/Behavioral: Negative for substance abuse. Objective:  Vitals:    10/18/21 1151 10/18/21 1156   BP: (!) 122/91 (!) 119/92   Pulse: (!) 114 (!) 110   Resp: 20    Temp: 98 °F (36.7 °C)    SpO2: 100%    Weight: 161 lb 9.6 oz (73.3 kg)    PainSc:   4    PainLoc: Chest        Physical Exam  Vitals reviewed. Constitutional:       Appearance: Normal appearance. Eyes:      General: No scleral icterus. Right eye: No discharge. Left eye: No discharge. Cardiovascular:      Rate and Rhythm: Normal rate and regular rhythm. Pulses: Normal pulses. Pulmonary:      Effort: Pulmonary effort is normal. No respiratory distress. Breath sounds: Normal breath sounds. Musculoskeletal:         General: No swelling or tenderness. Normal range of motion. Cervical back: Normal range of motion and neck supple. Skin:     General: Skin is warm and dry. Neurological:      Mental Status: She is alert and oriented to person, place, and time. Cranial Nerves: No cranial nerve deficit. Psychiatric:         Mood and Affect: Mood normal.         Behavior: Behavior normal.         Thought Content: Thought content normal.         Judgment: Judgment normal.           LABS     TESTS      Assessment/Plan:    1. SHUBHAM positive  - predniSONE (DELTASONE) 20 mg tablet; Take 40 mg (2 pills) for week #1; Take 20mg (1 pill) week #2; Take 10 mg (0.5 tablets) week #3  Dispense: 25 Tablet; Refill: 0  - predniSONE (DELTASONE) 5 mg tablet; Take 1 Tablet by mouth daily.  Take 5mg on week #4; Take 2.5 mg (0.5 tablet) on week #5; No more prednisone after this. Dispense: 11 Tablet; Refill: 0    2. Lupus ([address]  Pending to get an appointment from rheumatologist.  Provided phone for patient in order for her to call and see if she can make an appointment. - predniSONE (DELTASONE) 20 mg tablet; Take 40 mg (2 pills) for week #1; Take 20mg (1 pill) week #2; Take 10 mg (0.5 tablets) week #3  Dispense: 25 Tablet; Refill: 0  - predniSONE (DELTASONE) 5 mg tablet; Take 1 Tablet by mouth daily. Take 5mg on week #4; Take 2.5 mg (0.5 tablet) on week #5; No more prednisone after this. Dispense: 11 Tablet; Refill: 0    3. Muscle spasm  Chest pain sound like a muscle spasm, will try baclofen. - baclofen (LIORESAL) 10 mg tablet; Take 1 Tablet by mouth three (3) times daily as needed for Muscle Spasm(s). Dispense: 30 Tablet; Refill: 0    4. Dyspnea on exertion  Echo done showed an EF of 55 to 60%. Was read as normal cavity size, systolic function, and diastolic function. Mitral valve nonspecific thickening. Does not explain the reason why patient is short of breath.  - REFERRAL TO PULMONARY DISEASE  - EKG, 12 LEAD, INITIAL; Future    Lab review: no lab studies available for review at time of visit      I have discussed the diagnosis with the patient and the intended plan as seen in the above orders. The patient has received an after-visit summary and questions were answered concerning future plans. I have discussed medication side effects and warnings with the patient as well. I have reviewed the plan of care with the patient, accepted their input and they are in agreement with the treatment goals.          Keyshawn Mora MD

## 2021-10-18 NOTE — PROGRESS NOTES
Guillermina Giraldo presents today for   Chief Complaint   Patient presents with    Follow-up    Results       Is someone accompanying this pt? no    Is the patient using any DME equipment during OV? no    Depression Screening:  3 most recent PHQ Screens 10/18/2021   Little interest or pleasure in doing things Not at all   Feeling down, depressed, irritable, or hopeless Not at all   Total Score PHQ 2 0       Learning Assessment:  Learning Assessment 5/10/2016   PRIMARY LEARNER Patient   PRIMARY LANGUAGE Arabic   LEARNER PREFERENCE PRIMARY DEMONSTRATION   ANSWERED BY patient   RELATIONSHIP SELF       Abuse Screening:  Abuse Screening Questionnaire 5/10/2016   Do you ever feel afraid of your partner? N   Are you in a relationship with someone who physically or mentally threatens you? N   Is it safe for you to go home? Y       Fall Risk  Fall Risk Assessment, last 12 mths 3/4/2021   Able to walk? Yes   Fall in past 12 months? 0       Health Maintenance reviewed and discussed and ordered per Provider. Health Maintenance Due   Topic Date Due    COVID-19 Vaccine (1) Never done    DTaP/Tdap/Td series (1 - Tdap) Never done    Cervical cancer screen  03/30/2021    Flu Vaccine (1) Never done   . Coordination of Care:  1. Have you been to the ER, urgent care clinic since your last visit? Hospitalized since your last visit? no    2. Have you seen or consulted any other health care providers outside of the [address] [place] [address] since your last visit? Include any pap smears or colon screening.  no      Last  Checked na  Last UDS Checked na  Last Pain contract signed: na

## 2022-03-19 PROBLEM — K86.9 PANCREATIC LESION: Status: ACTIVE | Noted: 2021-05-27

## 2022-03-19 PROBLEM — M89.9 BONE LESION: Status: ACTIVE | Noted: 2021-05-27

## 2022-03-19 PROBLEM — R59.1 LYMPHADENOPATHY: Status: ACTIVE | Noted: 2021-03-19

## 2022-03-20 PROBLEM — J39.2 NASOPHARYNGEAL MASS: Status: ACTIVE | Noted: 2021-05-27

## 2023-05-24 RX ORDER — BACLOFEN 10 MG/1
10 TABLET ORAL 3 TIMES DAILY PRN
COMMUNITY
Start: 2021-10-18

## 2023-05-24 RX ORDER — PREDNISONE 5 MG/1
5 TABLET ORAL DAILY
COMMUNITY
Start: 2021-11-08

## 2023-05-24 RX ORDER — PREDNISONE 20 MG/1
TABLET ORAL
COMMUNITY
Start: 2021-10-18

## 2023-05-24 RX ORDER — IBUPROFEN 800 MG/1
800 TABLET ORAL EVERY 8 HOURS PRN
COMMUNITY
Start: 2020-12-18

## 2025-03-12 ENCOUNTER — OFFICE VISIT (OUTPATIENT)
Facility: CLINIC | Age: 35
End: 2025-03-12

## 2025-03-12 VITALS
SYSTOLIC BLOOD PRESSURE: 115 MMHG | HEIGHT: 63 IN | WEIGHT: 143.2 LBS | BODY MASS INDEX: 25.37 KG/M2 | RESPIRATION RATE: 16 BRPM | OXYGEN SATURATION: 98 % | TEMPERATURE: 98 F | HEART RATE: 99 BPM | DIASTOLIC BLOOD PRESSURE: 76 MMHG

## 2025-03-12 DIAGNOSIS — R06.2 WHEEZING: Primary | ICD-10-CM

## 2025-03-12 DIAGNOSIS — D64.9 ANEMIA, UNSPECIFIED TYPE: ICD-10-CM

## 2025-03-12 DIAGNOSIS — L65.9 HAIR LOSS: ICD-10-CM

## 2025-03-12 DIAGNOSIS — M32.19 OTHER SYSTEMIC LUPUS ERYTHEMATOSUS WITH OTHER ORGAN INVOLVEMENT (HCC): ICD-10-CM

## 2025-03-12 DIAGNOSIS — Z09 HOSPITAL DISCHARGE FOLLOW-UP: ICD-10-CM

## 2025-03-12 RX ORDER — ALBUTEROL SULFATE 90 UG/1
2 INHALANT RESPIRATORY (INHALATION) 4 TIMES DAILY PRN
Qty: 54 G | Refills: 1 | Status: SHIPPED | OUTPATIENT
Start: 2025-03-12

## 2025-03-12 SDOH — ECONOMIC STABILITY: FOOD INSECURITY: WITHIN THE PAST 12 MONTHS, THE FOOD YOU BOUGHT JUST DIDN'T LAST AND YOU DIDN'T HAVE MONEY TO GET MORE.: NEVER TRUE

## 2025-03-12 SDOH — ECONOMIC STABILITY: FOOD INSECURITY: WITHIN THE PAST 12 MONTHS, YOU WORRIED THAT YOUR FOOD WOULD RUN OUT BEFORE YOU GOT MONEY TO BUY MORE.: NEVER TRUE

## 2025-03-12 ASSESSMENT — PATIENT HEALTH QUESTIONNAIRE - PHQ9
SUM OF ALL RESPONSES TO PHQ QUESTIONS 1-9: 0
1. LITTLE INTEREST OR PLEASURE IN DOING THINGS: NOT AT ALL
SUM OF ALL RESPONSES TO PHQ QUESTIONS 1-9: 0
2. FEELING DOWN, DEPRESSED OR HOPELESS: NOT AT ALL

## 2025-03-12 NOTE — PROGRESS NOTES
Lula Small is a 34 y.o. year old female who presents today for   Chief Complaint   Patient presents with    Follow-Up from Hospital       \"Have you been to the ER, urgent care clinic since your last visit?  Hospitalized since your last visit?\"    Yes, ER for PNA, discharged 3/1/2025    “Have you seen or consulted any other health care providers outside our system since your last visit?”    no     “Have you had a pap smear?”    NO    Date of last Cervical Cancer screen (HPV or PAP): 3/30/2016           Click Here for Release of Records Request    - Lula Cullipher, LPN  [facility]  [facility]  Phone: [phone]  Fax: [phone]

## 2025-03-24 ENCOUNTER — HOSPITAL ENCOUNTER (OUTPATIENT)
Facility: HOSPITAL | Age: 35
Discharge: HOME | End: 2025-03-27
Payer: MEDICAID

## 2025-03-24 DIAGNOSIS — M32.19 OTHER SYSTEMIC LUPUS ERYTHEMATOSUS WITH OTHER ORGAN INVOLVEMENT: ICD-10-CM

## 2025-03-24 DIAGNOSIS — R06.2 WHEEZING: ICD-10-CM

## 2025-03-24 PROCEDURE — 94060 EVALUATION OF WHEEZING: CPT

## 2025-03-24 PROCEDURE — 94726 PLETHYSMOGRAPHY LUNG VOLUMES: CPT

## 2025-03-24 PROCEDURE — 94729 DIFFUSING CAPACITY: CPT

## 2025-03-27 DIAGNOSIS — J98.4 RESTRICTIVE LUNG DISEASE: ICD-10-CM

## 2025-03-27 DIAGNOSIS — J45.909 ASTHMA, UNSPECIFIED ASTHMA SEVERITY, UNSPECIFIED WHETHER COMPLICATED, UNSPECIFIED WHETHER PERSISTENT: Primary | ICD-10-CM

## 2025-04-04 ENCOUNTER — TELEPHONE (OUTPATIENT)
Facility: CLINIC | Age: 35
End: 2025-04-04

## 2025-04-04 NOTE — TELEPHONE ENCOUNTER
Pt called requesting a return call from Dr. Best regarding referral to Pulmonary Disease.  She called to schedule appt with Dr. Liu, as instructed, but they scheduled her with another provider.  Pt would like to know if this is okay and is requesting a return call?      Pt has been notified that they may be billed for the provider's call.     Thank you.

## 2025-04-09 NOTE — TELEPHONE ENCOUNTER
Called pt and discussed that the doctor she will be seeing is also great. She verbalized understanding

## 2025-05-12 ENCOUNTER — LAB (OUTPATIENT)
Facility: CLINIC | Age: 35
End: 2025-05-12

## 2025-05-12 DIAGNOSIS — M32.19 OTHER SYSTEMIC LUPUS ERYTHEMATOSUS WITH OTHER ORGAN INVOLVEMENT (HCC): ICD-10-CM

## 2025-05-12 DIAGNOSIS — L65.9 HAIR LOSS: ICD-10-CM

## 2025-05-12 DIAGNOSIS — D64.9 ANEMIA, UNSPECIFIED TYPE: ICD-10-CM

## 2025-05-12 LAB
A/G RATIO: 0.8 RATIO (ref 1.1–2.6)
ALBUMIN: 3.4 G/DL (ref 3.5–5)
ALP BLD-CCNC: 79 U/L (ref 25–115)
ALT SERPL-CCNC: 6 U/L (ref 5–40)
ANION GAP SERPL CALCULATED.3IONS-SCNC: 11 MMOL/L (ref 3–15)
AST SERPL-CCNC: 12 U/L (ref 10–37)
BASOPHILS # BLD: 1 % (ref 0–2)
BASOPHILS ABSOLUTE: 0 K/UL (ref 0–0.2)
BILIRUB SERPL-MCNC: 0.3 MG/DL (ref 0.2–1.2)
BUN BLDV-MCNC: 7 MG/DL (ref 6–22)
CALCIUM SERPL-MCNC: 8.8 MG/DL (ref 8.4–10.5)
CHLORIDE BLD-SCNC: 107 MMOL/L (ref 98–110)
CO2: 25 MMOL/L (ref 20–32)
CREAT SERPL-MCNC: 0.4 MG/DL (ref 0.5–1.2)
EOSINOPHIL # BLD: 2 % (ref 0–6)
EOSINOPHILS ABSOLUTE: 0.1 K/UL (ref 0–0.5)
GFR, ESTIMATED: >90 ML/MIN/1.73 SQ.M.
GLOBULIN: 4.4 G/DL (ref 2–4)
GLUCOSE: 80 MG/DL (ref 70–99)
HCT VFR BLD CALC: 39.5 % (ref 35.1–46.5)
HEMOGLOBIN: 11.8 G/DL (ref 11.7–15.5)
IRON % SATURATION: 8 % (ref 20–50)
IRON: 21 MCG/DL (ref 30–160)
LYMPHOCYTES # BLD: 20 % (ref 20–45)
LYMPHOCYTES ABSOLUTE: 0.8 K/UL (ref 1–4.8)
MCH RBC QN AUTO: 25 PG (ref 26–34)
MCHC RBC AUTO-ENTMCNC: 30 G/DL (ref 31–36)
MCV RBC AUTO: 83 FL (ref 80–99)
MONOCYTES ABSOLUTE: 0.4 K/UL (ref 0.1–1)
MONOCYTES: 10 % (ref 3–12)
NEUTROPHILS ABSOLUTE: 2.7 K/UL (ref 1.8–7.7)
NEUTROPHILS SEGMENTED: 67 % (ref 40–75)
PDW BLD-RTO: 15.9 % (ref 10–15.5)
PLATELET # BLD: 438 K/UL (ref 140–440)
PMV BLD AUTO: 9.8 FL (ref 9–13)
POTASSIUM SERPL-SCNC: 4.1 MMOL/L (ref 3.5–5.5)
RBC # BLD: 4.78 M/UL (ref 3.8–5.2)
SODIUM BLD-SCNC: 143 MMOL/L (ref 133–145)
T4 FREE: 1.1 NG/DL (ref 0.9–1.8)
TOTAL IRON BINDING CAPACITY: 260 MCG/DL (ref 228–428)
TOTAL PROTEIN: 7.8 G/DL (ref 6.4–8.3)
TSH SERPL DL<=0.05 MIU/L-ACNC: 1.71 MCU/ML (ref 0.27–4.2)
UNSATURATED IRON BINDING CAPACITY: 239 MCG/DL (ref 110–370)
WBC # BLD: 4.1 K/UL (ref 4–11)

## 2025-05-15 ENCOUNTER — HOSPITAL ENCOUNTER (OUTPATIENT)
Facility: HOSPITAL | Age: 35
Setting detail: SPECIMEN
Discharge: HOME | End: 2025-05-18

## 2025-05-15 PROCEDURE — 99001 SPECIMEN HANDLING PT-LAB: CPT

## 2025-05-16 LAB — SENTARA SPECIMEN COLLECTION: NORMAL

## 2025-05-19 ENCOUNTER — OFFICE VISIT (OUTPATIENT)
Facility: CLINIC | Age: 35
End: 2025-05-19
Payer: MEDICAID

## 2025-05-19 VITALS
HEART RATE: 110 BPM | BODY MASS INDEX: 25.09 KG/M2 | DIASTOLIC BLOOD PRESSURE: 80 MMHG | WEIGHT: 141.6 LBS | HEIGHT: 63 IN | SYSTOLIC BLOOD PRESSURE: 120 MMHG | TEMPERATURE: 97.9 F | OXYGEN SATURATION: 99 % | RESPIRATION RATE: 14 BRPM

## 2025-05-19 DIAGNOSIS — M79.89 NONTRAUMATIC PAIN AND SWELLING OF UPPER EXTREMITY: ICD-10-CM

## 2025-05-19 DIAGNOSIS — M79.609 NONTRAUMATIC PAIN AND SWELLING OF UPPER EXTREMITY: ICD-10-CM

## 2025-05-19 DIAGNOSIS — M79.606 PAIN AND SWELLING OF LOWER EXTREMITY, UNSPECIFIED LATERALITY: ICD-10-CM

## 2025-05-19 DIAGNOSIS — E61.1 IRON DEFICIENCY: ICD-10-CM

## 2025-05-19 DIAGNOSIS — L81.9 HYPERPIGMENTATION OF SKIN: ICD-10-CM

## 2025-05-19 DIAGNOSIS — R07.89 OTHER CHEST PAIN: ICD-10-CM

## 2025-05-19 DIAGNOSIS — M79.89 PAIN AND SWELLING OF LOWER EXTREMITY, UNSPECIFIED LATERALITY: ICD-10-CM

## 2025-05-19 DIAGNOSIS — M32.19 OTHER SYSTEMIC LUPUS ERYTHEMATOSUS WITH OTHER ORGAN INVOLVEMENT (HCC): Primary | ICD-10-CM

## 2025-05-19 PROCEDURE — 99214 OFFICE O/P EST MOD 30 MIN: CPT | Performed by: STUDENT IN AN ORGANIZED HEALTH CARE EDUCATION/TRAINING PROGRAM

## 2025-05-19 RX ORDER — TRAMADOL HYDROCHLORIDE 50 MG/1
50 TABLET ORAL EVERY 6 HOURS PRN
Qty: 60 TABLET | Refills: 0 | Status: SHIPPED | OUTPATIENT
Start: 2025-05-19 | End: 2025-06-03

## 2025-05-19 ASSESSMENT — ENCOUNTER SYMPTOMS
EYE PAIN: 0
EYE REDNESS: 0
DIARRHEA: 0
EYE DISCHARGE: 0
BACK PAIN: 0
EYE ITCHING: 0
VOMITING: 0
SHORTNESS OF BREATH: 0
COLOR CHANGE: 0
ABDOMINAL PAIN: 0
FACIAL SWELLING: 0
CONSTIPATION: 0

## 2025-05-19 NOTE — PROGRESS NOTES
Lula Small is a 34 y.o.  female and presents with    Chief Complaint   Patient presents with    Discuss Labs           Subjective:    Pt has dx of lupus and f/up w/ rheumatology. She has a hx of chest pains intermittently, that have been noticed to improve after Tramadol. This is something that rheumatologist has prescribed in the past and has helped after trying different medications. She feels like she has the pain at this time, and when she called the rheumatologist was told either PCP could give it, or she had to go in for an appt.     She also has been experiencing swelling and darkening of her feet and hands. She has noted the swelling is intermittent, seems like movement makes it worse. The darkening is not going away.       Patient Active Problem List   Diagnosis    Pancreatic lesion    Bone lesion    Lymphadenopathy    Nasopharyngeal mass      Past Medical History:   Diagnosis Date    Abnormal Papanicolaou smear of cervix 2012    colpo      Past Surgical History:   Procedure Laterality Date    BREAST LUMPECTOMY      rt breast lump removed     SECTION  ,     failure to progress.     OTHER SURGICAL HISTORY  ?    history of something removed from neck    US BIOPSY LYMPH NODE  2021    US LYMPH NODE BIOPSY 2021 [facility] RAD US      Family History   Problem Relation Age of Onset    No Known Problems Father     Thyroid Disease Maternal Grandmother     Asthma Paternal Grandmother     Heart Disease Paternal Grandmother     Asthma Mother     Hypertension Mother     Diabetes Paternal Grandmother      Social History     Socioeconomic History    Marital status: Single     Spouse name: Not on file    Number of children: Not on file    Years of education: Not on file    Highest education level: Not on file   Occupational History    Not on file   Tobacco Use    Smoking status: Never    Smokeless tobacco: Never    Tobacco comments:     Quit smoking: social smoker in the past.

## 2025-05-19 NOTE — PROGRESS NOTES
Lula Small is a 34 y.o. year old female who presents today for   Chief Complaint   Patient presents with    Discuss Labs        \"Have you been to the ER, urgent care clinic since your last visit?  Hospitalized since your last visit?\"   NO     “Have you seen or consulted any other health care providers outside our system since your last visit?”   NO      “Have you had a pap smear?”    YES 12/2024    Date of last Cervical Cancer screen (HPV or PAP): 3/30/2016             - PARVEEN Shipman [employer] [facility]  Phone: [phone]  Fax: [phone]

## 2025-07-10 ENCOUNTER — OFFICE VISIT (OUTPATIENT)
Age: 35
End: 2025-07-10

## 2025-07-10 VITALS
DIASTOLIC BLOOD PRESSURE: 85 MMHG | HEART RATE: 93 BPM | WEIGHT: 140.4 LBS | TEMPERATURE: 97.4 F | OXYGEN SATURATION: 100 % | BODY MASS INDEX: 24.87 KG/M2 | SYSTOLIC BLOOD PRESSURE: 125 MMHG | RESPIRATION RATE: 18 BRPM

## 2025-07-10 DIAGNOSIS — J98.4 RESTRICTIVE LUNG DISEASE: ICD-10-CM

## 2025-07-10 DIAGNOSIS — R09.1 PLEURITIS: ICD-10-CM

## 2025-07-10 DIAGNOSIS — I89.0 LYMPHEDEMA: ICD-10-CM

## 2025-07-10 DIAGNOSIS — M32.9 SLE (SYSTEMIC LUPUS ERYTHEMATOSUS RELATED SYNDROME) (HCC): ICD-10-CM

## 2025-07-10 DIAGNOSIS — J45.50 SEVERE PERSISTENT ASTHMA WITHOUT COMPLICATION (HCC): Primary | ICD-10-CM

## 2025-07-10 RX ORDER — FLUTICASONE FUROATE, UMECLIDINIUM BROMIDE AND VILANTEROL TRIFENATATE 100; 62.5; 25 UG/1; UG/1; UG/1
1 POWDER RESPIRATORY (INHALATION) DAILY
Qty: 1 EACH | Refills: 5 | Status: SHIPPED | OUTPATIENT
Start: 2025-07-10

## 2025-07-10 RX ORDER — AZATHIOPRINE 50 MG/1
150 TABLET ORAL DAILY
COMMUNITY

## 2025-07-10 RX ORDER — ANIFROLUMAB 300 MG/2ML
300 INJECTION, SOLUTION INTRAVENOUS
COMMUNITY

## 2025-07-10 RX ORDER — TRAMADOL HYDROCHLORIDE 50 MG/1
50 TABLET ORAL 4 TIMES DAILY
COMMUNITY

## 2025-07-10 RX ORDER — COLCHICINE 0.6 MG/1
0.6 TABLET ORAL DAILY
COMMUNITY

## 2025-07-10 RX ORDER — FLUTICASONE FUROATE, UMECLIDINIUM BROMIDE AND VILANTEROL TRIFENATATE 100; 62.5; 25 UG/1; UG/1; UG/1
1 POWDER RESPIRATORY (INHALATION) DAILY
Qty: 1 EACH | Refills: 0 | Status: SHIPPED | COMMUNITY
Start: 2025-07-10

## 2025-07-10 RX ORDER — MYCOPHENOLATE MOFETIL 500 MG/1
TABLET ORAL
COMMUNITY

## 2025-07-10 RX ORDER — HYDROXYCHLOROQUINE SULFATE 200 MG/1
400 TABLET, FILM COATED ORAL DAILY
COMMUNITY
Start: 2025-06-25

## 2025-07-10 ASSESSMENT — ENCOUNTER SYMPTOMS
ROS SKIN COMMENTS: ANOREXIA
COLOR CHANGE: 0
SINUS PRESSURE: 0
TROUBLE SWALLOWING: 0
APNEA: 0
SHORTNESS OF BREATH: 1
VOMITING: 0
EYE DISCHARGE: 0
SORE THROAT: 0
EYE ITCHING: 0
ABDOMINAL PAIN: 0
STRIDOR: 0
BACK PAIN: 0
CHEST TIGHTNESS: 0
WHEEZING: 0
NAUSEA: 0
RHINORRHEA: 0
COUGH: 0
BLOOD IN STOOL: 0
VOICE CHANGE: 0
EYE PAIN: 0
PHOTOPHOBIA: 0
CHOKING: 0
EYE REDNESS: 0

## 2025-07-10 NOTE — PROGRESS NOTES
Lula Small presents today for   Chief Complaint   Patient presents with    New Patient     Referred by Dr. Estephania Cm for Asthma    Results     Spirometry done 03/24/25       Is someone accompanying this pt? No    Is the patient using any DME equipment during OV? No    -DME Company NA    Depression Screening:        3/12/2025     2:45 PM   PHQ-9 Questionaire   Little interest or pleasure in doing things 0   Feeling down, depressed, or hopeless 0   PHQ-9 Total Score 0       Learning Needs Questionnaire:     No question data found.      Fall Risk:          No data to display                 Abuse Screening:          No data to display                  Coordination of Care:    1. Have you been to the ER, urgent care clinic since your last visit? Hospitalized since your last visit? No    2. Have you seen or consulted any other health care providers outside of the [facility] since your last visit? Include any pap smears or colon screening. Rheumatology, OBGYN    Medication list has been update per patient.        
into the IVC/hepatic veins.    Impression  1. No aortic aneurysm or dissection.  2. Negative for PE within study limitations as described above. Motion artifact and suboptimal bolus timing.  3. Small bilateral effusions right greater than left which appear to be partially loculated. Associated areas of chronic atelectasis and additional areas of parenchymal consolidation that may reflect atelectasis or pneumonia. Correlate for signs or symptoms of superimposed mild volume overload. Imaging follow-up to clearing recommended.  4. Axillary adenopathy, likely related to history of SLE.  5. Decreased size of pericardial effusion.  6. New upper abdominal ascites and persistent splenomegaly.    Signed By: Gera Singh MD on 2/27/2025 8:42 PM              An electronic signature was used to authenticate this note.    --Cata Nova MD

## 2025-08-18 ENCOUNTER — OFFICE VISIT (OUTPATIENT)
Facility: CLINIC | Age: 35
End: 2025-08-18
Payer: MEDICAID

## 2025-08-18 VITALS
HEART RATE: 97 BPM | RESPIRATION RATE: 16 BRPM | BODY MASS INDEX: 25.66 KG/M2 | OXYGEN SATURATION: 95 % | SYSTOLIC BLOOD PRESSURE: 116 MMHG | WEIGHT: 144.8 LBS | DIASTOLIC BLOOD PRESSURE: 74 MMHG | HEIGHT: 63 IN | TEMPERATURE: 97.6 F

## 2025-08-18 DIAGNOSIS — E61.1 IRON DEFICIENCY: ICD-10-CM

## 2025-08-18 DIAGNOSIS — M32.19 OTHER SYSTEMIC LUPUS ERYTHEMATOSUS WITH OTHER ORGAN INVOLVEMENT (HCC): Primary | ICD-10-CM

## 2025-08-18 DIAGNOSIS — R00.0 TACHYCARDIA: ICD-10-CM

## 2025-08-18 PROCEDURE — 99214 OFFICE O/P EST MOD 30 MIN: CPT | Performed by: STUDENT IN AN ORGANIZED HEALTH CARE EDUCATION/TRAINING PROGRAM

## 2025-08-18 ASSESSMENT — ENCOUNTER SYMPTOMS
VOMITING: 0
EYE ITCHING: 0
FACIAL SWELLING: 0
CONSTIPATION: 0
COLOR CHANGE: 0
EYE REDNESS: 0
SHORTNESS OF BREATH: 0
DIARRHEA: 0
EYE DISCHARGE: 0
BACK PAIN: 0
ABDOMINAL PAIN: 0
EYE PAIN: 0